# Patient Record
Sex: FEMALE | Race: WHITE | NOT HISPANIC OR LATINO
[De-identification: names, ages, dates, MRNs, and addresses within clinical notes are randomized per-mention and may not be internally consistent; named-entity substitution may affect disease eponyms.]

---

## 2021-07-15 PROBLEM — Z00.00 ENCOUNTER FOR PREVENTIVE HEALTH EXAMINATION: Status: ACTIVE | Noted: 2021-07-15

## 2021-08-20 ENCOUNTER — APPOINTMENT (OUTPATIENT)
Dept: NEUROLOGY | Facility: CLINIC | Age: 26
End: 2021-08-20

## 2021-09-29 ENCOUNTER — APPOINTMENT (OUTPATIENT)
Dept: NEUROLOGY | Facility: CLINIC | Age: 26
End: 2021-09-29
Payer: COMMERCIAL

## 2021-09-29 ENCOUNTER — TRANSCRIPTION ENCOUNTER (OUTPATIENT)
Age: 26
End: 2021-09-29

## 2021-09-29 VITALS
HEART RATE: 145 BPM | SYSTOLIC BLOOD PRESSURE: 175 MMHG | HEIGHT: 69 IN | WEIGHT: 180 LBS | BODY MASS INDEX: 26.66 KG/M2 | DIASTOLIC BLOOD PRESSURE: 102 MMHG

## 2021-09-29 VITALS — SYSTOLIC BLOOD PRESSURE: 148 MMHG | DIASTOLIC BLOOD PRESSURE: 95 MMHG | HEART RATE: 137 BPM

## 2021-09-29 PROCEDURE — 99072 ADDL SUPL MATRL&STAF TM PHE: CPT

## 2021-09-29 PROCEDURE — 99204 OFFICE O/P NEW MOD 45 MIN: CPT

## 2021-09-30 ENCOUNTER — INPATIENT (INPATIENT)
Facility: HOSPITAL | Age: 26
LOS: 4 days | Discharge: HOME | DRG: 99 | End: 2021-10-05
Attending: PSYCHIATRY & NEUROLOGY | Admitting: PSYCHIATRY & NEUROLOGY
Payer: COMMERCIAL

## 2021-09-30 VITALS
RESPIRATION RATE: 19 BRPM | TEMPERATURE: 98 F | WEIGHT: 179.9 LBS | HEIGHT: 69 IN | OXYGEN SATURATION: 100 % | DIASTOLIC BLOOD PRESSURE: 115 MMHG | HEART RATE: 120 BPM | SYSTOLIC BLOOD PRESSURE: 177 MMHG

## 2021-09-30 DIAGNOSIS — R56.9 UNSPECIFIED CONVULSIONS: ICD-10-CM

## 2021-09-30 LAB
ALBUMIN SERPL ELPH-MCNC: 4.2 G/DL — SIGNIFICANT CHANGE UP (ref 3.3–5)
ALP SERPL-CCNC: 37 U/L — LOW (ref 40–120)
ALT FLD-CCNC: 15 U/L — SIGNIFICANT CHANGE UP (ref 10–45)
AMPHET UR-MCNC: NEGATIVE — SIGNIFICANT CHANGE UP
ANION GAP SERPL CALC-SCNC: 17 MMOL/L — SIGNIFICANT CHANGE UP (ref 5–17)
APPEARANCE UR: CLEAR — SIGNIFICANT CHANGE UP
AST SERPL-CCNC: 33 U/L — SIGNIFICANT CHANGE UP (ref 10–40)
BACTERIA # UR AUTO: NEGATIVE — SIGNIFICANT CHANGE UP
BARBITURATES UR SCN-MCNC: NEGATIVE — SIGNIFICANT CHANGE UP
BASOPHILS # BLD AUTO: 0.04 K/UL — SIGNIFICANT CHANGE UP (ref 0–0.2)
BASOPHILS NFR BLD AUTO: 0.3 % — SIGNIFICANT CHANGE UP (ref 0–2)
BENZODIAZ UR-MCNC: NEGATIVE — SIGNIFICANT CHANGE UP
BILIRUB SERPL-MCNC: 0.3 MG/DL — SIGNIFICANT CHANGE UP (ref 0.2–1.2)
BILIRUB UR-MCNC: NEGATIVE — SIGNIFICANT CHANGE UP
BUN SERPL-MCNC: 15 MG/DL — SIGNIFICANT CHANGE UP (ref 7–23)
CALCIUM SERPL-MCNC: 9.2 MG/DL — SIGNIFICANT CHANGE UP (ref 8.4–10.5)
CHLORIDE SERPL-SCNC: 102 MMOL/L — SIGNIFICANT CHANGE UP (ref 96–108)
CO2 SERPL-SCNC: 20 MMOL/L — LOW (ref 22–31)
COCAINE METAB.OTHER UR-MCNC: NEGATIVE — SIGNIFICANT CHANGE UP
COLOR SPEC: SIGNIFICANT CHANGE UP
CREAT SERPL-MCNC: 0.8 MG/DL — SIGNIFICANT CHANGE UP (ref 0.5–1.3)
CRP SERPL-MCNC: <3 MG/L — SIGNIFICANT CHANGE UP (ref 0–4)
DIFF PNL FLD: ABNORMAL
EOSINOPHIL # BLD AUTO: 0.04 K/UL — SIGNIFICANT CHANGE UP (ref 0–0.5)
EOSINOPHIL NFR BLD AUTO: 0.3 % — SIGNIFICANT CHANGE UP (ref 0–6)
EPI CELLS # UR: 3 /HPF — SIGNIFICANT CHANGE UP
ERYTHROCYTE [SEDIMENTATION RATE] IN BLOOD: 31 MM/HR — HIGH (ref 0–15)
GLUCOSE SERPL-MCNC: 90 MG/DL — SIGNIFICANT CHANGE UP (ref 70–99)
GLUCOSE UR QL: NEGATIVE — SIGNIFICANT CHANGE UP
HCG SERPL-ACNC: <2 MIU/ML — SIGNIFICANT CHANGE UP
HCT VFR BLD CALC: 38.9 % — SIGNIFICANT CHANGE UP (ref 34.5–45)
HGB BLD-MCNC: 12.7 G/DL — SIGNIFICANT CHANGE UP (ref 11.5–15.5)
HYALINE CASTS # UR AUTO: 1 /LPF — SIGNIFICANT CHANGE UP (ref 0–2)
IMM GRANULOCYTES NFR BLD AUTO: 1.4 % — SIGNIFICANT CHANGE UP (ref 0–1.5)
KETONES UR-MCNC: NEGATIVE — SIGNIFICANT CHANGE UP
LDH SERPL L TO P-CCNC: 407 U/L — HIGH (ref 50–242)
LEUKOCYTE ESTERASE UR-ACNC: NEGATIVE — SIGNIFICANT CHANGE UP
LYMPHOCYTES # BLD AUTO: 19.8 % — SIGNIFICANT CHANGE UP (ref 13–44)
LYMPHOCYTES # BLD AUTO: 2.45 K/UL — SIGNIFICANT CHANGE UP (ref 1–3.3)
MAGNESIUM SERPL-MCNC: 2 MG/DL — SIGNIFICANT CHANGE UP (ref 1.6–2.6)
MCHC RBC-ENTMCNC: 32 PG — SIGNIFICANT CHANGE UP (ref 27–34)
MCHC RBC-ENTMCNC: 32.6 GM/DL — SIGNIFICANT CHANGE UP (ref 32–36)
MCV RBC AUTO: 98 FL — SIGNIFICANT CHANGE UP (ref 80–100)
METHADONE UR-MCNC: NEGATIVE — SIGNIFICANT CHANGE UP
MONOCYTES # BLD AUTO: 1.32 K/UL — HIGH (ref 0–0.9)
MONOCYTES NFR BLD AUTO: 10.7 % — SIGNIFICANT CHANGE UP (ref 2–14)
NEUTROPHILS # BLD AUTO: 8.34 K/UL — HIGH (ref 1.8–7.4)
NEUTROPHILS NFR BLD AUTO: 67.5 % — SIGNIFICANT CHANGE UP (ref 43–77)
NITRITE UR-MCNC: NEGATIVE — SIGNIFICANT CHANGE UP
NRBC # BLD: 0 /100 WBCS — SIGNIFICANT CHANGE UP (ref 0–0)
OPIATES UR-MCNC: NEGATIVE — SIGNIFICANT CHANGE UP
OXYCODONE UR-MCNC: NEGATIVE — SIGNIFICANT CHANGE UP
PCP SPEC-MCNC: SIGNIFICANT CHANGE UP
PCP UR-MCNC: NEGATIVE — SIGNIFICANT CHANGE UP
PH UR: 7 — SIGNIFICANT CHANGE UP (ref 5–8)
PHOSPHATE SERPL-MCNC: 3.1 MG/DL — SIGNIFICANT CHANGE UP (ref 2.5–4.5)
PLATELET # BLD AUTO: 313 K/UL — SIGNIFICANT CHANGE UP (ref 150–400)
POTASSIUM SERPL-MCNC: 4.1 MMOL/L — SIGNIFICANT CHANGE UP (ref 3.5–5.3)
POTASSIUM SERPL-SCNC: 4.1 MMOL/L — SIGNIFICANT CHANGE UP (ref 3.5–5.3)
PROT SERPL-MCNC: 8.2 G/DL — SIGNIFICANT CHANGE UP (ref 6–8.3)
PROT UR-MCNC: NEGATIVE — SIGNIFICANT CHANGE UP
RBC # BLD: 3.97 M/UL — SIGNIFICANT CHANGE UP (ref 3.8–5.2)
RBC # FLD: 15.3 % — HIGH (ref 10.3–14.5)
RBC CASTS # UR COMP ASSIST: 3 /HPF — SIGNIFICANT CHANGE UP (ref 0–4)
SARS-COV-2 RNA SPEC QL NAA+PROBE: SIGNIFICANT CHANGE UP
SODIUM SERPL-SCNC: 139 MMOL/L — SIGNIFICANT CHANGE UP (ref 135–145)
SP GR SPEC: 1.01 — LOW (ref 1.01–1.02)
THC UR QL: NEGATIVE — SIGNIFICANT CHANGE UP
THYROPEROXIDASE AB SERPL-ACNC: 54.3 IU/ML — HIGH
TSH SERPL-MCNC: 3.31 UIU/ML — SIGNIFICANT CHANGE UP (ref 0.27–4.2)
UROBILINOGEN FLD QL: NEGATIVE — SIGNIFICANT CHANGE UP
WBC # BLD: 12.36 K/UL — HIGH (ref 3.8–10.5)
WBC # FLD AUTO: 12.36 K/UL — HIGH (ref 3.8–10.5)
WBC UR QL: 1 /HPF — SIGNIFICANT CHANGE UP (ref 0–5)

## 2021-09-30 PROCEDURE — 95720 EEG PHY/QHP EA INCR W/VEEG: CPT

## 2021-09-30 PROCEDURE — 93010 ELECTROCARDIOGRAM REPORT: CPT

## 2021-09-30 PROCEDURE — 99285 EMERGENCY DEPT VISIT HI MDM: CPT

## 2021-09-30 PROCEDURE — 99223 1ST HOSP IP/OBS HIGH 75: CPT

## 2021-09-30 RX ORDER — GABAPENTIN 400 MG/1
600 CAPSULE ORAL ONCE
Refills: 0 | Status: DISCONTINUED | OUTPATIENT
Start: 2021-09-30 | End: 2021-09-30

## 2021-09-30 RX ORDER — PANTOPRAZOLE SODIUM 20 MG/1
40 TABLET, DELAYED RELEASE ORAL
Refills: 0 | Status: DISCONTINUED | OUTPATIENT
Start: 2021-09-30 | End: 2021-10-05

## 2021-09-30 RX ORDER — CANNABIDIOL 100 MG/ML
500 SOLUTION ORAL DAILY
Refills: 0 | Status: DISCONTINUED | OUTPATIENT
Start: 2021-09-30 | End: 2021-09-30

## 2021-09-30 RX ORDER — CANNABIDIOL 100 MG/ML
100 SOLUTION ORAL
Refills: 0 | Status: DISCONTINUED | OUTPATIENT
Start: 2021-09-30 | End: 2021-10-05

## 2021-09-30 RX ORDER — LANOLIN ALCOHOL/MO/W.PET/CERES
10 CREAM (GRAM) TOPICAL AT BEDTIME
Refills: 0 | Status: DISCONTINUED | OUTPATIENT
Start: 2021-09-30 | End: 2021-10-01

## 2021-09-30 RX ORDER — HYDROXYCHLOROQUINE SULFATE 200 MG
400 TABLET ORAL DAILY
Refills: 0 | Status: DISCONTINUED | OUTPATIENT
Start: 2021-09-30 | End: 2021-09-30

## 2021-09-30 RX ORDER — HYDROXYCHLOROQUINE SULFATE 200 MG
200 TABLET ORAL
Refills: 0 | Status: DISCONTINUED | OUTPATIENT
Start: 2021-09-30 | End: 2021-10-01

## 2021-09-30 RX ORDER — GABAPENTIN 400 MG/1
600 CAPSULE ORAL DAILY
Refills: 0 | Status: DISCONTINUED | OUTPATIENT
Start: 2021-09-30 | End: 2021-10-05

## 2021-09-30 RX ADMIN — GABAPENTIN 600 MILLIGRAM(S): 400 CAPSULE ORAL at 18:45

## 2021-09-30 RX ADMIN — Medication 10 MILLIGRAM(S): at 21:18

## 2021-09-30 RX ADMIN — Medication 200 MILLIGRAM(S): at 19:33

## 2021-09-30 RX ADMIN — Medication 0.2 MILLIGRAM(S): at 21:18

## 2021-09-30 RX ADMIN — CANNABIDIOL 100 MILLIGRAM(S): 100 SOLUTION ORAL at 21:18

## 2021-09-30 NOTE — ED PROVIDER NOTE - NSICDXPASTMEDICALHX_GEN_ALL_CORE_FT
PAST MEDICAL HISTORY:  Autoimmune encephalitis     Hashimoto's thyroiditis     Systemic lupus erythematosus

## 2021-09-30 NOTE — ED ADULT NURSE NOTE - OBJECTIVE STATEMENT
Pt is a 25 Y A&O x3 F with hx of seizures possibly caused by autoimmune encephalitis on IVIG and steroids, white coat hypertension presenting to ED for EMU admission by neurologist. Pt states her last seizure was in May 2021. Pt denies headache, dizziness, blurry vision, chest pain, SOB. Pt arrives to ED breathing unlabored on RA. PERRL. Pt speaking in complete sentences. Skin is warm and dry. No diaphoresis noted. No edema noted to LE b/l. Sensation intact. Pt has strong strength in all extremities. Pt ambulatory with steady gait. IV established. Safety and comfort maintained.

## 2021-09-30 NOTE — H&P ADULT - HISTORY OF PRESENT ILLNESS
25-y/o R-HF w a h/o Hashimoto thyroiditis (2013), unspecified AIE (2007) and recently diagnosed SLE (12/2020) presenting for further evaluation and management of AIE.  Symptom onset started at age 18.  Initially had insomnia which then progressed to spells vs seizure events.  Describes a sudden 15-30 second generalized convulsion a/w post-event prolonged staring, speech arrest sometimes lasting up to 30 min.  At times she has bowel incontinence.  No tongue bite or urinary incontinence.  She can remain confused up to 1 day afterwards.  Last episode 05/2020.  Current anti-seizure meds include CBD 500mg daily and gabapentin 600 mg daily.      Further manifestations of her AIE included poor functioning in school, reporting poor concentration and poor working memory skills.  PT states she had been dx w/ Hashimoto's encephalopathy in 2013 and TPO antibodies were reportedly elevated.  Per PT, CSF analysis basic studies were normal. CSF AIE panel demonstrated rising TPO, minor elevation in JT no other abnormalities. PET scan done in June 2020, demonstrated cerebellar hypometabolism.     PT's current AIE regiment includes IVIG 70g q2 weeks (last 09/22/21), prednisone 24mg daily.  Was also on rituximab but no longer taking.  She recently had a possible lupus diagnosis, for which she was started on Plaquenil 400mg QD and methotrexate 15 mg.  Had last had attempt of down-titration of prednisone and IVIG but then had re-emergence of spells/seizures.      Of note, when asked regarding her tachycardia, states that she always runs tachycardic and slightly HTN including before her AIE diagnosis. Is followed for this by her PCP who is just monitoring her but no current treatment.

## 2021-09-30 NOTE — H&P ADULT - NSHPLABSRESULTS_GEN_ALL_CORE
12.7   12.36 )-----------( 313      ( 30 Sep 2021 09:24 )             38.9         139  |  102  |  15  ----------------------------<  90  4.1   |  20<L>  |  0.80    Ca    9.2      30 Sep 2021 09:33  Phos  3.1       Mg     2.0         TPro  8.2  /  Alb  4.2  /  TBili  0.3  /  DBili  x   /  AST  33  /  ALT  15  /  AlkPhos  37<L>      Urinalysis Basic - ( 30 Sep 2021 09:28 )    Color: Light Yellow / Appearance: Clear / S.007 / pH: x  Gluc: x / Ketone: Negative  / Bili: Negative / Urobili: Negative   Blood: x / Protein: Negative / Nitrite: Negative   Leuk Esterase: Negative / RBC: 3 /hpf / WBC 1 /HPF   Sq Epi: x / Non Sq Epi: 3 /hpf / Bacteria: Negative

## 2021-09-30 NOTE — ED ADULT NURSE NOTE - NS ED NOTE  TALK SOMEONE YN
pt w/ symptoms c/w possible unstable angina. pt advised cath. pt aware of options/risks including death. No

## 2021-09-30 NOTE — ED PROVIDER NOTE - ATTENDING CONTRIBUTION TO CARE
Attending MD Pierson:  I personally have seen and examined this patient.  Resident note reviewed and agree on plan of care and except where noted.  See HPI, PE, and MDM for details.

## 2021-09-30 NOTE — ED PROVIDER NOTE - OBJECTIVE STATEMENT
25F hx ?autoimmune encephalitis (on biweekly ivig, steroids), ?seizures sent in tba to EMU for 25F hx ?autoimmune encephalitis (on biweekly ivig, steroids), ?seizures sent in tba to EMU for EEG. pt reports last seizure in may, episodes of loss of memory sometimes a/w loss of bowel continence. Pt reports otherwise nl, no episode since may. Denies symptoms at this time.

## 2021-09-30 NOTE — H&P ADULT - ASSESSMENT
INCOMPLETE  Assessment:  25y R-handed F with h/o hashimoto's thyroiditis, autoimmune encephalitis and recently diagnosed SLE.   On exam, she has no neurologic deficit.     IMPRESSION: Known autoimmune encephalitis of undetermined specific serology, ? Limbic encephalitis, possibly related to Hashimoto vs. JT from prior work up.      Plan  [] Admit to EMU for further workup of specific AIE  [] LP with repeat AIE panel including markers for synaptic dysfunction  [] 24 hr vEEG to evaluate for slowing  [] 5 days of 1000mg methylprednisolone.    [] Send TPO thyroglobulin.    [] D/C methotrexate and PO prednisone 24 mg QD.    [] Continue sleep meds   [] No anti-epileptics for now as PT   [] Monitor BP and HR, PT apparently runs baseline tachycardic.      Braxton Camarena, DO  PGY-4, Chief Neurology Resident    Please note attending attestation to follow.   INCOMPLETE  Assessment:  25y R-handed F with h/o hashimoto's thyroiditis, autoimmune encephalitis and recently diagnosed SLE.   On exam, she has no neurologic deficit.     IMPRESSION: Known autoimmune encephalitis of undetermined specific serology, ? Limbic encephalitis, possibly related to Hashimoto vs. JT from prior work up.      Plan  [] Admit to EMU for further workup of specific AIE  [] LP with repeat AIE panel including markers for synaptic dysfunction  [] 24 hr vEEG to evaluate for slowing  [] 5 days of 1000mg methylprednisolone.    [] Send TPO thyroglobulin.    [] D/C methotrexate and PO prednisone 24 mg QD.    [] Continue sleep meds   [] No anti-epileptics for now as PT   [] Plan to repeat MRI brain w/wo w epilepsy protocol and at some point PET scan  [] PT may need Actemra   [] Last IVIG 09/22.   [] Monitor BP and HR, PT apparently runs baseline tachycardic.      Braxton Camarena, DO  PGY-4, Chief Neurology Resident    Please note attending attestation to follow.   Assessment:  25y R-handed F with h/o hashimoto's thyroiditis, autoimmune encephalitis and recently diagnosed SLE. On exam, she has no neurologic deficit.     IMPRESSION: Previously diagnosed autoimmune encephalitis of undetermined specific serology, perhaps secondary to Hashimoto vs. JT.  DDx perhaps including limbic encephalitis as well.     Plan  [] Admit to EMU for further workup of specific AIE  [] LP with repeat AIE panel including markers for synaptic dysfunction  [] 24 hr vEEG to evaluate for slowing  [] 5 days of 1000mg methylprednisolone.    [] Send TPO thyroglobulin.    [] D/C methotrexate and PO prednisone 24 mg QD.    [] Continue sleep meds   [] No anti-epileptics for now as PT   [] Plan to repeat MRI brain w/wo w epilepsy protocol and at some point PET scan  [] PT may need Actemra   [] Last IVIG 09/22.   [] Monitor BP and HR, PT apparently runs baseline tachycardic.      Braxton Camarena, DO  PGY-4, Chief Neurology Resident    Please note attending attestation to follow.

## 2021-09-30 NOTE — ED PROVIDER NOTE - CLINICAL SUMMARY MEDICAL DECISION MAKING FREE TEXT BOX
Attending MD Pierson:  25F with reported history of autoimmune encephalitis, ?seizures presenting at request of Dr. Tineo for admission to EMU for EEG for possible seizures. Patient states seizure like episodes last occurred in may this year, nothing since. Patient here tachycardic and mildly hypertensive to 160s systolic HR 110s, she attributes this to "white coat hypertension". Grossly non-focal neuro exam. We will contact neurology service and defer to their expertise on further work up and management of patient's suspected seizure disorder.      *The above represents an initial assessment/impression. Please refer to progress notes for potential changes in patient clinical course*

## 2021-09-30 NOTE — H&P ADULT - NSHPPHYSICALEXAM_GEN_ALL_CORE
General: Obese, sitting up in bed in NAD  Cardiac: Tachycardic in sinus rhythm, no murmurs rubs or gallops  Resp: CTA throughout  Mental Status: AxO to person, place, situation, time. 3/3 memory on immediate recall and on 5 minute delay.   Language: Fluent with preserved naming, repetition and comprehension.  Follows all commands.  Cranial Nerves: PERRL (R = 3mm, L = 3mm).  EOMI no nystagmus. V1-3 intact to light touch.  No facial asymmetry b/l.  Hearing grossly normal to conversation.  Speech clear.  Symmetric palate elevation in midline.  Tongue midline, normal movements.  Cortical: Visual fields intact.  No extinction on double simultaneous touch and no signs of neglect.   Motor: Normal muscle bulk & tone.  5/5 strength throughout.    Sensation: Symmetric B/L preserved sensation to light touch, temperature, proprioception..    Reflexes: 2/4 throughout.  Plantar Responses are downgoing B/L.   Coordination: Intact rapid-alternating movements. No dysmetria on finger-to-nose or heel-to-shin.  No dysdiadochokinesia  Gait: Normal stance, stride length, touch off, arm swing and gomez.   Normal Romberg. No postural instability. Vital Signs Last 24 Hrs  T(C): 36.9 (30 Sep 2021 09:03), Max: 36.9 (30 Sep 2021 09:03)  T(F): 98.4 (30 Sep 2021 09:03), Max: 98.4 (30 Sep 2021 09:03)  HR: 110 (30 Sep 2021 09:03) (110 - 120)  BP: 164/114 (30 Sep 2021 09:03) (164/114 - 177/115)  BP(mean): 128 (30 Sep 2021 09:03) (128 - 128)  RR: 20 (30 Sep 2021 09:03) (19 - 20)  SpO2: 100% (30 Sep 2021 09:03) (100% - 100%)    General: Obese, sitting up in bed in NAD  Cardiac: Tachycardic in sinus rhythm, no murmurs rubs or gallops  Resp: CTA throughout  Mental Status: AxO to person, place, situation, time. 3/3 memory on immediate recall and on 5 minute delay.   Language: Fluent with preserved naming, repetition and comprehension.  Follows all commands.  Cranial Nerves: PERRL (R = 3mm, L = 3mm).  EOMI no nystagmus. V1-3 intact to light touch.  No facial asymmetry b/l.  Hearing grossly normal to conversation.  Speech clear.  Symmetric palate elevation in midline.  Tongue midline, normal movements.  Cortical: Visual fields intact.  No extinction on double simultaneous touch and no signs of neglect.   Motor: Normal muscle bulk & tone.  5/5 strength throughout.    Sensation: Symmetric B/L preserved sensation to light touch, temperature, proprioception..    Reflexes: 2/4 throughout.  Plantar Responses are downgoing B/L.   Coordination: Intact rapid-alternating movements. No dysmetria on finger-to-nose or heel-to-shin.  No dysdiadochokinesia  Gait: Normal stance, stride length, touch off, arm swing and gomez.   Normal Romberg. No postural instability.

## 2021-09-30 NOTE — ED PROVIDER NOTE - PHYSICAL EXAMINATION
Gen: WDWN, NAD  HEENT: EOMI, no nasal discharge, mucous membranes moist  CV: RRR, +S1/S2, no M/R/G  Resp: CTAB, no W/R/R  GI: Abdomen soft non-distended, NTTP, no masses  MSK: No open wounds, no bruising, no LE edema  Neuro: CN2-12 grossly intact, A&Ox4, MS +5/5 in UE and LE BL, finger to nose smooth and rapid, gross sensation intact in UE and LE BL, negative pronator drift   Psych: appropriate mood, denies AH, VH, SI

## 2021-10-01 LAB
APPEARANCE CSF: CLEAR — SIGNIFICANT CHANGE UP
COLOR CSF: SIGNIFICANT CHANGE UP
COVID-19 SPIKE DOMAIN AB INTERP: POSITIVE
COVID-19 SPIKE DOMAIN ANTIBODY RESULT: 19.6 U/ML — HIGH
GLUCOSE CSF-MCNC: 60 MG/DL — SIGNIFICANT CHANGE UP (ref 40–70)
HAV IGM SER-ACNC: SIGNIFICANT CHANGE UP
HBV CORE IGM SER-ACNC: SIGNIFICANT CHANGE UP
HBV SURFACE AG SER-ACNC: SIGNIFICANT CHANGE UP
HCV AB S/CO SERPL IA: 0.28 S/CO — SIGNIFICANT CHANGE UP (ref 0–0.99)
HCV AB SERPL-IMP: SIGNIFICANT CHANGE UP
LDH CSF L TO P-CCNC: 43 U/L — SIGNIFICANT CHANGE UP
LDH FLD-CCNC: 43 U/L — SIGNIFICANT CHANGE UP
LYMPHOCYTES # CSF: 87 % — HIGH (ref 40–80)
MONOS+MACROS NFR CSF: 13 % — LOW (ref 15–45)
NEUTROPHILS # CSF: 0 % — SIGNIFICANT CHANGE UP (ref 0–6)
NRBC NFR CSF: 2 /UL — SIGNIFICANT CHANGE UP (ref 0–5)
PROT CSF-MCNC: 26 MG/DL — SIGNIFICANT CHANGE UP (ref 15–45)
RBC # CSF: 0 /UL — SIGNIFICANT CHANGE UP (ref 0–0)
SARS-COV-2 IGG+IGM SERPL QL IA: 19.6 U/ML — HIGH
SARS-COV-2 IGG+IGM SERPL QL IA: POSITIVE
T PALLIDUM AB TITR SER: POSITIVE
T3 SERPL-MCNC: 166 NG/DL — SIGNIFICANT CHANGE UP (ref 80–200)
T4 AB SER-ACNC: 8.1 UG/DL — SIGNIFICANT CHANGE UP (ref 4.6–12)
THYROGLOB AB FLD IA-ACNC: 27.5 IU/ML — SIGNIFICANT CHANGE UP
THYROGLOB AB SERPL-ACNC: 27.5 IU/ML — SIGNIFICANT CHANGE UP
TSH SERPL-MCNC: 1.14 UIU/ML — SIGNIFICANT CHANGE UP (ref 0.27–4.2)
TUBE TYPE: SIGNIFICANT CHANGE UP

## 2021-10-01 PROCEDURE — 88188 FLOWCYTOMETRY/READ 9-15: CPT

## 2021-10-01 PROCEDURE — 99233 SBSQ HOSP IP/OBS HIGH 50: CPT

## 2021-10-01 PROCEDURE — 95720 EEG PHY/QHP EA INCR W/VEEG: CPT

## 2021-10-01 RX ORDER — LIDOCAINE HCL 20 MG/ML
10 VIAL (ML) INJECTION ONCE
Refills: 0 | Status: DISCONTINUED | OUTPATIENT
Start: 2021-10-01 | End: 2021-10-05

## 2021-10-01 RX ORDER — CANNABIDIOL 100 MG/ML
0.5 SOLUTION ORAL
Qty: 0 | Refills: 0 | DISCHARGE

## 2021-10-01 RX ORDER — LANOLIN ALCOHOL/MO/W.PET/CERES
10 CREAM (GRAM) TOPICAL AT BEDTIME
Refills: 0 | Status: DISCONTINUED | OUTPATIENT
Start: 2021-10-01 | End: 2021-10-05

## 2021-10-01 RX ORDER — FOLIC ACID 0.8 MG
1 TABLET ORAL DAILY
Refills: 0 | Status: DISCONTINUED | OUTPATIENT
Start: 2021-10-01 | End: 2021-10-05

## 2021-10-01 RX ORDER — HYDROXYCHLOROQUINE SULFATE 200 MG
400 TABLET ORAL
Qty: 0 | Refills: 0 | DISCHARGE

## 2021-10-01 RX ORDER — NORGESTIMATE AND ETHINYL ESTRADIOL 7DAYSX3 LO
1 KIT ORAL
Qty: 0 | Refills: 0 | DISCHARGE

## 2021-10-01 RX ORDER — ALENDRONATE SODIUM 70 MG/1
1 TABLET ORAL
Qty: 0 | Refills: 0 | DISCHARGE

## 2021-10-01 RX ORDER — ENOXAPARIN SODIUM 100 MG/ML
40 INJECTION SUBCUTANEOUS AT BEDTIME
Refills: 0 | Status: DISCONTINUED | OUTPATIENT
Start: 2021-10-01 | End: 2021-10-05

## 2021-10-01 RX ADMIN — Medication 58 MILLIGRAM(S): at 11:46

## 2021-10-01 RX ADMIN — PANTOPRAZOLE SODIUM 40 MILLIGRAM(S): 20 TABLET, DELAYED RELEASE ORAL at 06:18

## 2021-10-01 RX ADMIN — ENOXAPARIN SODIUM 40 MILLIGRAM(S): 100 INJECTION SUBCUTANEOUS at 22:12

## 2021-10-01 RX ADMIN — Medication 10 MILLIGRAM(S): at 22:08

## 2021-10-01 RX ADMIN — CANNABIDIOL 100 MILLIGRAM(S): 100 SOLUTION ORAL at 08:09

## 2021-10-01 RX ADMIN — GABAPENTIN 600 MILLIGRAM(S): 400 CAPSULE ORAL at 22:08

## 2021-10-01 RX ADMIN — Medication 1 MILLIGRAM(S): at 11:42

## 2021-10-01 RX ADMIN — Medication 200 MILLIGRAM(S): at 06:18

## 2021-10-01 RX ADMIN — CANNABIDIOL 100 MILLIGRAM(S): 100 SOLUTION ORAL at 16:51

## 2021-10-01 RX ADMIN — Medication 0.2 MILLIGRAM(S): at 22:09

## 2021-10-01 NOTE — EEG REPORT - NS EEG TEXT BOX
EPILEPSY MONITORING UNIT REPORT   [facility]: [address], [place] [address], Ph#: [phone] [facility]: [address] [place] [address], [place] [address], Ph#: [phone] Office: [address] [place] [address] Ph#: [phone]  Patient Name: Marely Escamilla   Age: 25 years, : 1995 MRN #: [id number], Lewis: U 464 D  Referring Physician: Dr. Tineo   /   DEVIN admit          Study Started: 17:00 on 2021 Study Ended: hh:mm on /xx/xxxx              Study Information:  EEG Recording Technique: The patient underwent continuous Video-EEG monitoring, using Telemetry System hardware on the XLTek Digital System. EEG and video data were stored on a computer hard drive with important events saved in digital archive files. The material was reviewed by a physician (electroencephalographer / epileptologist) on a daily basis. Vamshi and seizure detection algorithms were utilized and reviewed. An EEG Technician attended to the patient, and was available throughout daytime work hours.  The epilepsy center neurologist was available in person or on call 24-hours per day.  EEG Placement and Labeling of Electrodes: The EEG was performed utilizing 20 channel referential EEG connections (coronal over temporal over parasagittal montage) using all standard 10-20 electrode placements with EKG, with additional electrodes placed in the inferior temporal region using the modified 10-10 montage electrode placements for elective admissions, or if deemed necessary. Recording was at a sampling rate of 256 samples per second per channel. Time synchronized digital video recording was done simultaneously with EEG recording. A low light infrared camera was used for low light recording.   History:  25-y/o R-HF w a h/o Hashimoto thyroiditis (), unspecified AIE () and recently diagnosed SLE (2020) presenting for further evaluation and management of AIE. Symptom onset started at age 18.  Initially had insomnia which then progressed to spells vs seizure events.  Describes a sudden 15-30 second generalized convulsion a/w post-event prolonged staring, speech arrest sometimes lasting up to 30 min.  At times she has bowel incontinence. No tongue bite or urinary incontinence. She can remain confused up to 1 day afterwards.  Last episode 2020.  Current anti-seizure meds include CBD 500mg daily and gabapentin 600 mg daily.   Further manifestations of her AIE included poor functioning in school, reporting poor concentration and poor working memory skills.  PT states she had been dx w/ Hashimoto's encephalopathy in  and TPO antibodies were reportedly elevated.  Per PT, CSF analysis basic studies were normal. CSF AIE panel demonstrated rising TPO, minor elevation in JT no other abnormalities. PET scan done in 2020, demonstrated cerebellar hypometabolism.  PT's current AIE regiment includes IVIG 70g q2 weeks (last 21), prednisone 24mg daily.  Was also on rituximab but no longer taking.  She recently had a possible lupus diagnosis, for which she was started on Plaquenil 400mg QD and methotrexate 15 mg.  Had last had attempt of down-titration of prednisone and IVIG but then had re-emergence of spells/seizures.  Of note, when asked regarding her tachycardia, states that she always runs tachycardic and slightly HTN including before her AIE diagnosis. Is followed for this by her PCP who is just monitoring her but no current treatment.     Home Antiepileptic Medication and Device  Cannabidiol 100 BID, Gabapentin 600 QD  Interpretation:  Day 1 – 	Start: 2021  17:00 	End: 10/01/2021  08:00 	Duration: 14 hr  40 min  Daily EEG Visual Analysis  FINDINGS:  The background was continuous, spontaneously variable and reactive. During wakefulness, the posterior dominant rhythm consisted of symmetric, well-modulated 10 Hz activity, with amplitude to 30 uV, that attenuated to eye opening.  Low amplitude frontal beta was noted in wakefulness.  Background Slowing: Diffuse theta and delta slowing.  Focal Slowing:  None were present.  Sleep Background: Drowsiness was characterized by fragmentation, attenuation, and slowing of the background activity.   Sleep was characterized by the presence of vertex waves, symmetric sleep spindles and K-complexes.  Other Non-Epileptiform Findings: None were present.  Activation Procedures:  Hyperventilation was not performed.   Photic stimulation was not performed.  Interictal Epileptiform Activity:  None were present.  Events: No events or seizures recorded.  Artifacts: Intermittent myogenic and movement artifacts were noted.  ECG: The heart rate on single channel ECG was predominantly between  BPM.  AEDs:   Cannabidiol 100 BID, Gabapentin 600 QD  EEG Summary:  Abnormal EEG in the awake, drowsy and asleep states. Background slowing, generalized  Impression/Clinical Correlate:  This is an abnormal EEG record due to presence of mild nonspecific diffuse or multifocal cerebral dysfunction.  No epileptiform pattern or seizures were seen.  *** PRELIMINARY REPORT - PENDING EPILEPSY ATTENDING REVIEW ***   Charles Guevara MD, ELISABET Fellow, [facility]    ------------------------------------ EEG Reading Room: [phone] On Call Service After Hours: [phone]       EPILEPSY MONITORING UNIT REPORT   [facility]: [address], [place] [address], Ph#: [phone] [facility]: [address], [place] [address], Ph#: [phone] Office: [address] [place] [address] Ph#: [phone]  Patient Name: Marely Escamilla   Age: 25 years, : 1995 MRN #: [id number], Lewis: U 464 D  Referring Physician: Dr. Tineo   /   DEVIN admit             Study Information:  EEG Recording Technique: The patient underwent continuous Video-EEG monitoring, using Telemetry System hardware on the XLTek Digital System. EEG and video data were stored on a computer hard drive with important events saved in digital archive files. The material was reviewed by a physician (electroencephalographer / epileptologist) on a daily basis. Vamshi and seizure detection algorithms were utilized and reviewed. An EEG Technician attended to the patient, and was available throughout daytime work hours.  The epilepsy center neurologist was available in person or on call 24-hours per day.  EEG Placement and Labeling of Electrodes: The EEG was performed utilizing 20 channel referential EEG connections (coronal over temporal over parasagittal montage) using all standard 10-20 electrode placements with EKG, with additional electrodes placed in the inferior temporal region using the modified 10-10 montage electrode placements for elective admissions, or if deemed necessary. Recording was at a sampling rate of 256 samples per second per channel. Time synchronized digital video recording was done simultaneously with EEG recording. A low light infrared camera was used for low light recording.   History:  25-y/o R-HF w a h/o Hashimoto thyroiditis (), unspecified AIE () and recently diagnosed SLE (2020) presenting for further evaluation and management of AIE. Symptom onset started at age 18.  Initially had insomnia which then progressed to spells vs seizure events.  Describes a sudden 15-30 second generalized convulsion a/w post-event prolonged staring, speech arrest sometimes lasting up to 30 min.  At times she has bowel incontinence. No tongue bite or urinary incontinence. She can remain confused up to 1 day afterwards.  Last episode 2020.  Current anti-seizure meds include CBD 500mg daily and gabapentin 600 mg daily.   Further manifestations of her AIE included poor functioning in school, reporting poor concentration and poor working memory skills.  PT states she had been dx w/ Hashimoto's encephalopathy in  and TPO antibodies were reportedly elevated.  Per PT, CSF analysis basic studies were normal. CSF AIE panel demonstrated rising TPO, minor elevation in JT no other abnormalities. PET scan done in 2020, demonstrated cerebellar hypometabolism.  PT's current AIE regiment includes IVIG 70g q2 weeks (last 21), prednisone 24mg daily.  Was also on rituximab but no longer taking.  She recently had a possible lupus diagnosis, for which she was started on Plaquenil 400mg QD and methotrexate 15 mg.  Had last had attempt of down-titration of prednisone and IVIG but then had re-emergence of spells/seizures.  Of note, when asked regarding her tachycardia, states that she always runs tachycardic and slightly HTN including before her AIE diagnosis. Is followed for this by her PCP who is just monitoring her but no current treatment.     Home Antiepileptic Medication and Device  Cannabidiol 100 BID, Gabapentin 600 QD  Interpretation:  Day 1 – 	Start: 2021  17:00 	End: 10/01/2021  08:00 	Duration: 14 hr  40 min  Daily EEG Visual Analysis  FINDINGS:  The background was continuous, spontaneously variable and reactive. During wakefulness, the posterior dominant rhythm consisted of symmetric, well-modulated 10 Hz activity, with amplitude to 30 uV, that attenuated to eye opening.  Low amplitude frontal beta was noted in wakefulness.  Background Slowing: Diffuse theta and delta slowing.  Focal Slowing:  None were present.  Sleep Background: Drowsiness was characterized by fragmentation, attenuation, and slowing of the background activity.   Sleep was characterized by the presence of vertex waves, symmetric sleep spindles and K-complexes.  Other Non-Epileptiform Findings: None were present.  Activation Procedures:  Hyperventilation was not performed.   Photic stimulation was not performed.  Interictal Epileptiform Activity:  None were present.  Events: No events or seizures recorded.  Artifacts: Intermittent myogenic and movement artifacts were noted.  ECG: The heart rate on single channel ECG was predominantly between  BPM.  AEDs:   Cannabidiol 100 BID, Gabapentin 600 QD  EEG Summary:  Abnormal EEG in the awake, drowsy and asleep states. Background slowing, generalized  Impression/Clinical Correlate:  This is an abnormal EEG record due to presence of mild nonspecific diffuse or multifocal cerebral dysfunction.  No epileptiform pattern or seizures were seen.  *** PRELIMINARY REPORT - PENDING EPILEPSY ATTENDING REVIEW ***   Charles Guevara MD, ELISABET Fellow, [facility] [place]    ------------------------------------ EEG Reading Room: [phone] On Call Service After Hours: [phone]       EPILEPSY MONITORING UNIT REPORT   [facility]: [address], [place] [address], Ph#: [phone] [facility]: [address], [place] [address], Ph#: [phone] Office: [address] [place] [address] Ph#: [phone]  Patient Name: Marely Escamilla   Age: 25 years, : 1995 MRN #: [id number], Lewis: U 464 D  Referring Physician: Dr. Tineo   /   DEVIN admit             Study Information:  EEG Recording Technique: The patient underwent continuous Video-EEG monitoring, using Telemetry System hardware on the XLTek Digital System. EEG and video data were stored on a computer hard drive with important events saved in digital archive files. The material was reviewed by a physician (electroencephalographer / epileptologist) on a daily basis. Vamshi and seizure detection algorithms were utilized and reviewed. An EEG Technician attended to the patient, and was available throughout daytime work hours.  The epilepsy center neurologist was available in person or on call 24-hours per day.  EEG Placement and Labeling of Electrodes: The EEG was performed utilizing 20 channel referential EEG connections (coronal over temporal over parasagittal montage) using all standard 10-20 electrode placements with EKG, with additional electrodes placed in the inferior temporal region using the modified 10-10 montage electrode placements for elective admissions, or if deemed necessary. Recording was at a sampling rate of 256 samples per second per channel. Time synchronized digital video recording was done simultaneously with EEG recording. A low light infrared camera was used for low light recording.   History:  25-y/o R-HF w a h/o Hashimoto thyroiditis (), unspecified AIE () and recently diagnosed SLE (2020) presenting for further evaluation and management of AIE. Symptom onset started at age 18.  Initially had insomnia which then progressed to spells vs seizure events.  Describes a sudden 15-30 second generalized convulsion a/w post-event prolonged staring, speech arrest sometimes lasting up to 30 min.  At times she has bowel incontinence. No tongue bite or urinary incontinence. She can remain confused up to 1 day afterwards.  Last episode 2020.  Current anti-seizure meds include CBD 500mg daily and gabapentin 600 mg daily.   Further manifestations of her AIE included poor functioning in school, reporting poor concentration and poor working memory skills.  PT states she had been dx w/ Hashimoto's encephalopathy in  and TPO antibodies were reportedly elevated.  Per PT, CSF analysis basic studies were normal. CSF AIE panel demonstrated rising TPO, minor elevation in JT no other abnormalities. PET scan done in 2020, demonstrated cerebellar hypometabolism.  PT's current AIE regiment includes IVIG 70g q2 weeks (last 21), prednisone 24mg daily.  Was also on rituximab but no longer taking.  She recently had a possible lupus diagnosis, for which she was started on Plaquenil 400mg QD and methotrexate 15 mg.  Had last had attempt of down-titration of prednisone and IVIG but then had re-emergence of spells/seizures.  Of note, when asked regarding her tachycardia, states that she always runs tachycardic and slightly HTN including before her AIE diagnosis. Is followed for this by her PCP who is just monitoring her but no current treatment.     Home Antiepileptic Medication and Device  Cannabidiol 100 BID, Gabapentin 600 QD  Interpretation:  Day 1 – 	Start: 2021  17:00 	End: 10/01/2021  08:00 	Duration: 14 hr  40 min  Daily EEG Visual Analysis  FINDINGS:  The background was continuous, spontaneously variable and reactive. During wakefulness, the posterior dominant rhythm consisted of symmetric, well-modulated 10 Hz activity, with amplitude to 30 uV, that attenuated to eye opening.  Low amplitude frontal beta was noted in wakefulness.  Background Slowing: Diffuse theta and delta slowing.  Focal Slowing:  None were present.  Sleep Background: Drowsiness was characterized by fragmentation, attenuation, and slowing of the background activity.   Sleep was characterized by the presence of vertex waves, symmetric sleep spindles and K-complexes.  Other Non-Epileptiform Findings: None were present.  Activation Procedures:  Hyperventilation was not performed.   Photic stimulation was not performed.  Interictal Epileptiform Activity:  None were present.  Events: No events or seizures recorded.  Artifacts: Intermittent myogenic and movement artifacts were noted.  ECG: The heart rate on single channel ECG was predominantly between  BPM.  AEDs:   Cannabidiol 100 BID, Gabapentin 600 QD  EEG Summary:  Abnormal EEG in the awake, drowsy and asleep states. Background slowing, generalized  Impression/Clinical Correlate:  This is an abnormal EEG record due to presence of mild nonspecific diffuse or multifocal cerebral dysfunction.  No epileptiform pattern or seizures were seen.   Charles Guevara MD, ELISABET Fellow, [facility] [place]    ------------------------------------ EEG Reading Room: [phone] On Call Service After Hours: [phone]

## 2021-10-01 NOTE — PROGRESS NOTE ADULT - SUBJECTIVE AND OBJECTIVE BOX
Interval History:  Patient seen and examined at the bedside. No acute events overnight.     MEDICATIONS  (STANDING):  cannabidiol Oral Solution 100 milliGRAM(s) Oral two times a day with meals  cloNIDine 0.2 milliGRAM(s) Oral at bedtime  gabapentin 600 milliGRAM(s) Oral daily  hydroxychloroquine 200 milliGRAM(s) Oral two times a day  lidocaine 1% (Preservative-free) Injectable 10 milliLiter(s) Local Injection once  melatonin 10 milliGRAM(s) Oral at bedtime  pantoprazole    Tablet 40 milliGRAM(s) Oral before breakfast    MEDICATIONS  (PRN):  LORazepam   Injectable 2 milliGRAM(s) IV Push once PRN For convulsion lasting >3 min    Allergies  Benadryl (Anaphylaxis)    Intolerances      ROS: Pertinent positives in HPI, all other ROS were reviewed and are negative.      Vital Signs Last 24 Hrs  T(C): 36.7 (01 Oct 2021 04:47), Max: 36.9 (30 Sep 2021 09:03)  T(F): 98.1 (01 Oct 2021 04:47), Max: 98.4 (30 Sep 2021 09:03)  HR: 86 (01 Oct 2021 04:47) (86 - 120)  BP: 112/75 (01 Oct 2021 04:47) (112/75 - 177/115)  BP(mean): 128 (30 Sep 2021 09:03) (128 - 128)  RR: 18 (01 Oct 2021 04:47) (16 - 20)  SpO2: 98% (01 Oct 2021 04:47) (96% - 100%)    NEUROLOGICAL EXAM:  MS: AAOX3, fluent, attends b/l; recent and remote memory intact; normal attention, language and fund of knowledge.   CN: VFF, EOMI, PERRL, no SCOTT, no APD,  V1-3 intact, no facial asymmetry, t/p midline, SCM/trap intact.  Eyes-Fundi: no papilledema.  Motor: Strength: 5/5 4x. Tone: normal. Bulk: normal. DTR 2+ symm.  Plantar flex b/l. Sensation: intact to LT/PP/Vibration/Position/Temperature 4x.   Coordination: intact 4x.   Gait:  Romberg negative, pull test negative; walks with narrow base, pivots in 2 steps.      Labs:   cbc                      12.7   12.36 )-----------( 313      ( 30 Sep 2021 09:24 )             38.9     [id number]    139  |  102  |  15  ----------------------------<  90  4.1   |  20<L>  |  0.80    Ca    9.2      30 Sep 2021 09:33  Phos  3.1       Mg     2.0         TPro  8.2  /  Alb  4.2  /  TBili  0.3  /  DBili  x   /  AST  33  /  ALT  15  /  AlkPhos  37<L>      Coags  Lipids  A1C  CardiacMarkers    LFTsLIVER FUNCTIONS - ( 30 Sep 2021 09:33 )  Alb: 4.2 g/dL / Pro: 8.2 g/dL / ALK PHOS: 37 U/L / ALT: 15 U/L / AST: 33 U/L / GGT: x           UAUrinalysis Basic - ( 30 Sep 2021 09:28 )    Color: Light Yellow / Appearance: Clear / S.007 / pH: x  Gluc: x / Ketone: Negative  / Bili: Negative / Urobili: Negative   Blood: x / Protein: Negative / Nitrite: Negative   Leuk Esterase: Negative / RBC: 3 /hpf / WBC 1 /HPF   Sq Epi: x / Non Sq Epi: 3 /hpf / Bacteria: Negative     Interval History:  Patient seen and examined at the bedside. No acute events overnight.     MEDICATIONS  (STANDING):  cannabidiol Oral Solution 100 milliGRAM(s) Oral two times a day with meals  cloNIDine 0.2 milliGRAM(s) Oral at bedtime  gabapentin 600 milliGRAM(s) Oral daily  hydroxychloroquine 200 milliGRAM(s) Oral two times a day  lidocaine 1% (Preservative-free) Injectable 10 milliLiter(s) Local Injection once  melatonin 10 milliGRAM(s) Oral at bedtime  pantoprazole    Tablet 40 milliGRAM(s) Oral before breakfast    MEDICATIONS  (PRN):  LORazepam   Injectable 2 milliGRAM(s) IV Push once PRN For convulsion lasting >3 min    Allergies  Benadryl (Anaphylaxis)    Intolerances      ROS: Pertinent positives in HPI, all other ROS were reviewed and are negative.      Vital Signs Last 24 Hrs  T(C): 36.7 (01 Oct 2021 04:47), Max: 36.9 (30 Sep 2021 09:03)  T(F): 98.1 (01 Oct 2021 04:47), Max: 98.4 (30 Sep 2021 09:03)  HR: 86 (01 Oct 2021 04:47) (86 - 120)  BP: 112/75 (01 Oct 2021 04:47) (112/75 - 177/115)  BP(mean): 128 (30 Sep 2021 09:03) (128 - 128)  RR: 18 (01 Oct 2021 04:47) (16 - 20)  SpO2: 98% (01 Oct 2021 04:47) (96% - 100%)    NEUROLOGICAL EXAM:  MS: AAOX3, fluent, attends b/l; recent and remote memory intact; normal attention, language and fund of knowledge.   CN: VFF, EOMI, PERRL, no SCOTT, no APD,  V1-3 intact, no facial asymmetry, t/p midline, SCM/trap intact.  Eyes-Fundi: no papilledema.  Motor: Strength: 5/5 4x.   Sensation: intact to LT 4x.   Coordination: intact 4x.   Gait:  Deferred while on EEG      Labs:   cbc                      12.7   12.36 )-----------( 313      ( 30 Sep 2021 09:24 )             38.9     [id number]    139  |  102  |  15  ----------------------------<  90  4.1   |  20<L>  |  0.80    Ca    9.2      30 Sep 2021 09:33  Phos  3.1       Mg     2.0         TPro  8.2  /  Alb  4.2  /  TBili  0.3  /  DBili  x   /  AST  33  /  ALT  15  /  AlkPhos  37<L>      Coags  Lipids  A1C  CardiacMarkers    LFTsLIVER FUNCTIONS - ( 30 Sep 2021 09:33 )  Alb: 4.2 g/dL / Pro: 8.2 g/dL / ALK PHOS: 37 U/L / ALT: 15 U/L / AST: 33 U/L / GGT: x           UAUrinalysis Basic - ( 30 Sep 2021 09:28 )    Color: Light Yellow / Appearance: Clear / S.007 / pH: x  Gluc: x / Ketone: Negative  / Bili: Negative / Urobili: Negative   Blood: x / Protein: Negative / Nitrite: Negative   Leuk Esterase: Negative / RBC: 3 /hpf / WBC 1 /HPF   Sq Epi: x / Non Sq Epi: 3 /hpf / Bacteria: Negative

## 2021-10-02 LAB
ALBUMIN SERPL ELPH-MCNC: 4.2 G/DL — SIGNIFICANT CHANGE UP (ref 3.3–5)
ALP SERPL-CCNC: 40 U/L — SIGNIFICANT CHANGE UP (ref 40–120)
ALT FLD-CCNC: 16 U/L — SIGNIFICANT CHANGE UP (ref 10–45)
ANION GAP SERPL CALC-SCNC: 17 MMOL/L — SIGNIFICANT CHANGE UP (ref 5–17)
AST SERPL-CCNC: 25 U/L — SIGNIFICANT CHANGE UP (ref 10–40)
BILIRUB SERPL-MCNC: 0.3 MG/DL — SIGNIFICANT CHANGE UP (ref 0.2–1.2)
BUN SERPL-MCNC: 14 MG/DL — SIGNIFICANT CHANGE UP (ref 7–23)
CALCIUM SERPL-MCNC: 9.3 MG/DL — SIGNIFICANT CHANGE UP (ref 8.4–10.5)
CHLORIDE SERPL-SCNC: 102 MMOL/L — SIGNIFICANT CHANGE UP (ref 96–108)
CO2 SERPL-SCNC: 15 MMOL/L — LOW (ref 22–31)
CREAT SERPL-MCNC: 0.57 MG/DL — SIGNIFICANT CHANGE UP (ref 0.5–1.3)
GAMMA INTERFERON BACKGROUND BLD IA-ACNC: 0.03 IU/ML — SIGNIFICANT CHANGE UP
GLUCOSE SERPL-MCNC: 137 MG/DL — HIGH (ref 70–99)
HCT VFR BLD CALC: 40.4 % — SIGNIFICANT CHANGE UP (ref 34.5–45)
HGB BLD-MCNC: 13.1 G/DL — SIGNIFICANT CHANGE UP (ref 11.5–15.5)
M TB IFN-G BLD-IMP: NEGATIVE — SIGNIFICANT CHANGE UP
M TB IFN-G CD4+ BCKGRND COR BLD-ACNC: -0.01 IU/ML — SIGNIFICANT CHANGE UP
M TB IFN-G CD4+CD8+ BCKGRND COR BLD-ACNC: -0.01 IU/ML — SIGNIFICANT CHANGE UP
MCHC RBC-ENTMCNC: 31.3 PG — SIGNIFICANT CHANGE UP (ref 27–34)
MCHC RBC-ENTMCNC: 32.4 GM/DL — SIGNIFICANT CHANGE UP (ref 32–36)
MCV RBC AUTO: 96.7 FL — SIGNIFICANT CHANGE UP (ref 80–100)
NRBC # BLD: 0 /100 WBCS — SIGNIFICANT CHANGE UP (ref 0–0)
PLATELET # BLD AUTO: 319 K/UL — SIGNIFICANT CHANGE UP (ref 150–400)
POTASSIUM SERPL-MCNC: 4.2 MMOL/L — SIGNIFICANT CHANGE UP (ref 3.5–5.3)
POTASSIUM SERPL-SCNC: 4.2 MMOL/L — SIGNIFICANT CHANGE UP (ref 3.5–5.3)
PROT CSF-MCNC: 26 MG/DL — SIGNIFICANT CHANGE UP (ref 15–45)
PROT SERPL-MCNC: 8.2 G/DL — SIGNIFICANT CHANGE UP (ref 6–8.3)
QUANT TB PLUS MITOGEN MINUS NIL: 3.5 IU/ML — SIGNIFICANT CHANGE UP
RBC # BLD: 4.18 M/UL — SIGNIFICANT CHANGE UP (ref 3.8–5.2)
RBC # FLD: 14.9 % — HIGH (ref 10.3–14.5)
SODIUM SERPL-SCNC: 134 MMOL/L — LOW (ref 135–145)
WBC # BLD: 22.01 K/UL — HIGH (ref 3.8–10.5)
WBC # FLD AUTO: 22.01 K/UL — HIGH (ref 3.8–10.5)

## 2021-10-02 PROCEDURE — 95720 EEG PHY/QHP EA INCR W/VEEG: CPT

## 2021-10-02 PROCEDURE — 99232 SBSQ HOSP IP/OBS MODERATE 35: CPT | Mod: GC

## 2021-10-02 RX ADMIN — GABAPENTIN 600 MILLIGRAM(S): 400 CAPSULE ORAL at 21:04

## 2021-10-02 RX ADMIN — ENOXAPARIN SODIUM 40 MILLIGRAM(S): 100 INJECTION SUBCUTANEOUS at 21:04

## 2021-10-02 RX ADMIN — Medication 1 MILLIGRAM(S): at 11:03

## 2021-10-02 RX ADMIN — CANNABIDIOL 100 MILLIGRAM(S): 100 SOLUTION ORAL at 07:52

## 2021-10-02 RX ADMIN — PANTOPRAZOLE SODIUM 40 MILLIGRAM(S): 20 TABLET, DELAYED RELEASE ORAL at 06:01

## 2021-10-02 RX ADMIN — Medication 0.2 MILLIGRAM(S): at 21:04

## 2021-10-02 RX ADMIN — CANNABIDIOL 100 MILLIGRAM(S): 100 SOLUTION ORAL at 16:23

## 2021-10-02 RX ADMIN — Medication 10 MILLIGRAM(S): at 21:04

## 2021-10-02 RX ADMIN — Medication 58 MILLIGRAM(S): at 06:01

## 2021-10-02 NOTE — EEG REPORT - NS EEG TEXT BOX
Day 2 – 	Start: 10/01/2021  08:00 	End: 10/02/2021  08:00 	Duration: 23 hr  58 min  Daily EEG Visual Analysis  FINDINGS:  The background was continuous, spontaneously variable and reactive. During wakefulness, the posterior dominant rhythm consisted of symmetric, well-modulated 10 Hz activity, with amplitude to 30 uV, that attenuated to eye opening.  Low amplitude frontal beta was noted in wakefulness.  Background Slowing: none  Focal Slowing:  None were present.  Sleep Background: Drowsiness was characterized by fragmentation, attenuation, and slowing of the background activity.   Sleep was characterized by the presence of vertex waves, symmetric sleep spindles and K-complexes.  Other Non-Epileptiform Findings: There is frequent lambda activity related to eye movements.  Activation Procedures:  Hyperventilation was not performed.   Photic stimulation was not performed.  Interictal Epileptiform Activity:  None were present.  Events: No events or seizures recorded.  Artifacts: Intermittent myogenic and movement artifacts were noted.  ECG: The heart rate on single channel ECG was predominantly between  BPM.  AEDs:   Cannabidiol 100 BID, Gabapentin 600 QD  EEG Summary:  Normal EEG in the awake, drowsy and asleep states. Lambda waves  Impression/Clinical Correlate:  This is an normal EEG record. Previously noted background slowing most likely due to frequent lambda waves, a normal finding. No epileptiform pattern or seizures were seen.   Charles Guevara MD, ELISABET Fellow, [facility]    Max Torres MD, PhD Director, Epilepsy Division, [facility]  ------------------------------------ EEG Reading Room: [phone] On Call Service After Hours: [phone]

## 2021-10-02 NOTE — PROGRESS NOTE ADULT - ASSESSMENT
25y R-handed F with h/o hashimoto's thyroiditis, autoimmune encephalitis and recently diagnosed SLE. On exam, she has no neurologic deficit.     IMPRESSION: Previously diagnosed autoimmune encephalitis of undetermined specific serology, perhaps secondary to Hashimoto vs. JT.  DDx perhaps including limbic encephalitis as well.     Plan  [] s/p LP 10/1  with repeat AIE panel including markers for synaptic dysfunction - YKL 40, SNAP 25, Neuro Granin  [] 24 hr vEEG to evaluate for slowing  [] 5 days of 1000mg methylprednisolone.    [] TPO thyroglobulin.    [] Continue sleep meds   [] No anti-epileptics for now as PT   [] Plan to repeat MRI brain w/wo w epilepsy protocol and at some point PET scan  [] PT will need Actemra after course of steroids

## 2021-10-02 NOTE — PROGRESS NOTE ADULT - SUBJECTIVE AND OBJECTIVE BOX
SUBJECTIVE: No events overnight    MEDICATIONS (HOME):  Home Medications:  alendronate 70 mg oral tablet: 1 tab(s) orally once a week (01 Oct 2021 22:11)  cannabidiol 100 mg/mL oral liquid: 0.5 milliliter(s) orally 2 times a day (01 Oct 2021 22:11)  cloNIDine 0.2 mg oral tablet: 1 tab(s) orally once a day (at bedtime) (01 Oct 2021 22:11)  folic acid 1 mg oral tablet: 1 tab(s) orally once a day except MTX days (01 Oct 2021 22:11)  gabapentin 600 mg oral tablet: 1 tab(s) orally once a day (01 Oct 2021 22:11)  IVI gram(s) intravenous every 2 weeks (01 Oct 2021 22:11)  Melatonin 10 mg oral capsule: 1 cap(s) orally once a day (at bedtime) (01 Oct 2021 22:11)  methotrexate 15 mg oral tablet: 1 tab(s) orally once a week (01 Oct 2021 22:11)  Plaquenil: 200 milligram(s) orally 2 times a day (01 Oct 2021 22:11)  predniSONE: 24 milligram(s) orally once a day (01 Oct 2021 22:11)  Strattera 60 mg oral capsule: 1 cap(s) orally once a day (in the morning) (01 Oct 2021 22:11)  Tri-Sprintec oral tablet: 1 tab(s) orally once a day (01 Oct 2021 22:11)    MEDICATIONS  (STANDING):  cannabidiol Oral Solution 100 milliGRAM(s) Oral two times a day with meals  cloNIDine 0.2 milliGRAM(s) Oral at bedtime  enoxaparin Injectable 40 milliGRAM(s) SubCutaneous at bedtime  folic acid 1 milliGRAM(s) Oral daily  gabapentin 600 milliGRAM(s) Oral daily  lidocaine 1% (Preservative-free) Injectable 10 milliLiter(s) Local Injection once  melatonin 10 milliGRAM(s) Oral at bedtime  methylPREDNISolone sodium succinate IVPB 1000 milliGRAM(s) IV Intermittent <User Schedule>  pantoprazole    Tablet 40 milliGRAM(s) Oral before breakfast    MEDICATIONS  (PRN):  LORazepam   Injectable 2 milliGRAM(s) IV Push once PRN For convulsion lasting >3 min    ALLERGIES/INTOLERANCES:  Allergies  Benadryl (Anaphylaxis)    Intolerances    VITALS & EXAMINATION:  Vital Signs Last 24 Hrs  T(C): 36.8 (02 Oct 2021 12:04), Max: 36.9 (01 Oct 2021 16:09)  T(F): 98.2 (02 Oct 2021 12:04), Max: 98.4 (01 Oct 2021 16:09)  HR: 98 (02 Oct 2021 12:04) (97 - 116)  BP: 128/76 (02 Oct 2021 12:04) (126/89 - 156/93)  BP(mean): --  RR: 18 (02 Oct 2021 12:04) (18 - 18)  SpO2: 97% (02 Oct 2021 12:04) (96% - 98%)    Neurological Exam:    MS: Awake, alert. Normal affect. Follows all commands, answering questions    Language: Speech is clear, fluent with good comprehension     CNs: eyes moving spontaneously in all directions. well developed masseter muscles b/l. No facial asymmetry b/l. Symmetric palate elevation in midline. Gag reflex deferred. Tongue midline, normal movements, no atrophy.    Motor: Normal muscle bulk & tone. No noticeable tremor or seizure. No pronator drift.  Moving all extremities equally without laterality         LABORATORY:  CBC                       13.1   22.01 )-----------( 319      ( 02 Oct 2021 07:38 )             40.4     Chem 10-02    134<L>  |  102  |  14  ----------------------------<  137<H>  4.2   |  15<L>  |  0.57    Ca    9.3      02 Oct 2021 07:36    TPro  8.2  /  Alb  4.2  /  TBili  0.3  /  DBili  x   /  AST  25  /  ALT  16  /  AlkPhos  40  10-02    LFTs LIVER FUNCTIONS - ( 02 Oct 2021 07:36 )  Alb: 4.2 g/dL / Pro: 8.2 g/dL / ALK PHOS: 40 U/L / ALT: 16 U/L / AST: 25 U/L / GGT: x           Coagulopathy   Lipid Panel   A1c   Cardiac enzymes     U/A

## 2021-10-03 ENCOUNTER — TRANSCRIPTION ENCOUNTER (OUTPATIENT)
Age: 26
End: 2021-10-03

## 2021-10-03 PROCEDURE — 95718 EEG PHYS/QHP 2-12 HR W/VEEG: CPT

## 2021-10-03 PROCEDURE — 99232 SBSQ HOSP IP/OBS MODERATE 35: CPT | Mod: GC

## 2021-10-03 RX ADMIN — CANNABIDIOL 100 MILLIGRAM(S): 100 SOLUTION ORAL at 16:55

## 2021-10-03 RX ADMIN — ENOXAPARIN SODIUM 40 MILLIGRAM(S): 100 INJECTION SUBCUTANEOUS at 21:20

## 2021-10-03 RX ADMIN — CANNABIDIOL 100 MILLIGRAM(S): 100 SOLUTION ORAL at 09:02

## 2021-10-03 RX ADMIN — PANTOPRAZOLE SODIUM 40 MILLIGRAM(S): 20 TABLET, DELAYED RELEASE ORAL at 05:20

## 2021-10-03 RX ADMIN — Medication 58 MILLIGRAM(S): at 05:20

## 2021-10-03 RX ADMIN — Medication 10 MILLIGRAM(S): at 21:19

## 2021-10-03 RX ADMIN — GABAPENTIN 600 MILLIGRAM(S): 400 CAPSULE ORAL at 21:20

## 2021-10-03 RX ADMIN — Medication 0.2 MILLIGRAM(S): at 21:20

## 2021-10-03 RX ADMIN — Medication 1 MILLIGRAM(S): at 11:03

## 2021-10-03 NOTE — EEG REPORT - NS EEG TEXT BOX
Day 3 – 	Start: 10/02/2021  08:00 	End: 10/03/2021  08:00 	Duration: 23 hr  58 min  Daily EEG Visual Analysis  FINDINGS:  The background was continuous, spontaneously variable and reactive. During wakefulness, the posterior dominant rhythm consisted of symmetric, well-modulated 10 Hz activity, with amplitude to 30 uV, that attenuated to eye opening.  Low amplitude frontal beta was noted in wakefulness.  Background Slowing: none  Focal Slowing:  None were present.  Sleep Background: Drowsiness was characterized by fragmentation, attenuation, and slowing of the background activity.   Sleep was characterized by the presence of vertex waves, symmetric sleep spindles and K-complexes.  Other Non-Epileptiform Findings: There is frequent lambda activity related to eye movements.  Activation Procedures:  Hyperventilation was not performed.   Photic stimulation was not performed.  Interictal Epileptiform Activity:  None were present.  Events: No events or seizures recorded.  Artifacts: Intermittent myogenic and movement artifacts were noted.  ECG: The heart rate on single channel ECG was predominantly between  BPM.  AEDs:   Cannabidiol 100 BID, Gabapentin 600 QD  EEG Summary:  Normal EEG in the awake, drowsy and asleep states. Lambda waves  Impression/Clinical Correlate:  This is an normal EEG record. Previously noted background slowing most likely due to frequent lambda waves, a normal finding. No epileptiform pattern or seizures were seen.   Charles Guevara MD, ELISABET Fellow, [facility]    Max Torres MD, PhD Director, Epilepsy Division, [facility]  ------------------------------------ EEG Reading Room: [phone] On Call Service After Hours: [phone]

## 2021-10-03 NOTE — PROGRESS NOTE ADULT - ATTENDING COMMENTS
AIE possible Hashimoto type    Lp completed - awaiting results  TPO 54, necroinflammatory markers YKL 40, SNAP 25, Ng( neuro granin)  continue solumedrol 1000 daily for 5 days post Lp - now day 2/5  continue EEG - if negative overnight may DC tomorrow.
AIE possible Hashimoto type    Lp completed - awaiting results  TPO 54, necroinflammatory markers YKL 40, SNAP 25, Ng( neuro granin)  continue solumedrol 1000 daily for 5 days post Lp - now day 2/5  discontinue EEG
AIE possible Hashimoto type    Lp today for autoimmune panel  TPO, TRP  necroinflammatory markers YKL 40, SNAP 25, Ng( neuro granin)  start solumedrol 1000 daily for 5 days post Lp.  continue EEG

## 2021-10-03 NOTE — DISCUSSION/SUMMARY
[FreeTextEntry1] : Patient presents for evaluation of AIE. patient. Patient with previous CSF analysis demonstrating elevation in TPO antibody and minor possibly beneath threshold evaluation for JT. Patient's wish is to be titrated off steroids as the negative side effects, are burdensome. Patient \par \par Plan\par - Admit to EMU\par - LP with CSF autoimmune panel ,further markers discovered since patient's last visit\par - connection to EEG for monitoring\par - plan to repeat MRI and PET\par - patient will likely need therapy with Actemra\par - plan for outpatient IVIG

## 2021-10-03 NOTE — EEG REPORT - NS EEG TEXT BOX
Day 4 – 	Start: 10/03/2021  08:00  	End: 10/03/2021  16:48  	Duration: 08 hr  48 min    Daily EEG Visual Analysis    FINDINGS:  The background was continuous, spontaneously variable and reactive. During wakefulness, the posterior dominant rhythm consisted of symmetric, well-modulated 10 Hz activity, with amplitude to 30 uV, that attenuated to eye opening.  Low amplitude frontal beta was noted in wakefulness.    Background Slowing:  none    Focal Slowing:   None were present.    Sleep Background:  Drowsiness was characterized by fragmentation, attenuation, and slowing of the background activity.    Sleep was characterized by the presence of vertex waves, symmetric sleep spindles and K-complexes.    Other Non-Epileptiform Findings:  There is frequent lambda activity related to eye movements.    Activation Procedures:   Hyperventilation was not performed.    Photic stimulation was not performed.    Interictal Epileptiform Activity:   None were present.    Events:  No events or seizures recorded.    Artifacts:  Intermittent myogenic and movement artifacts were noted.    ECG:  The heart rate on single channel ECG was predominantly between  BPM.    AEDs:    Cannabidiol 100 BID, Gabapentin 600 QD    EEG Summary:    Normal EEG in the awake, drowsy and asleep states.  Lambda waves    Impression/Clinical Correlate:    This is an normal EEG record. Previously noted background slowing most likely due to frequent lambda waves, a normal finding. No epileptiform pattern or seizures were seen.      Charles Guevara MD, ELISABET  Fellow, [facility]        ------------------------------------  EEG Reading Room: [phone]  On Call Service After Hours: [phone]

## 2021-10-03 NOTE — PROGRESS NOTE ADULT - SUBJECTIVE AND OBJECTIVE BOX
MRN-[id number]    Subjective: 26 yo female seen and examined at bedside. No events overnight.    PAST MEDICAL & SURGICAL HISTORY:  Hashimoto's thyroiditis    Autoimmune encephalitis    Systemic lupus erythematosus    No significant past surgical history    FAMILY HISTORY:  FH: HTN (hypertension) (Father)    Social Hx:  Nonsmoker, no drug or alcohol use    Home Medications:  alendronate 70 mg oral tablet: 1 tab(s) orally once a week (01 Oct 2021 22:11)  cannabidiol 100 mg/mL oral liquid: 0.5 milliliter(s) orally 2 times a day (01 Oct 2021 22:11)  cloNIDine 0.2 mg oral tablet: 1 tab(s) orally once a day (at bedtime) (01 Oct 2021 22:11)  folic acid 1 mg oral tablet: 1 tab(s) orally once a day except MTX days (01 Oct 2021 22:11)  gabapentin 600 mg oral tablet: 1 tab(s) orally once a day (01 Oct 2021 22:11)  IVI gram(s) intravenous every 2 weeks (01 Oct 2021 22:11)  Melatonin 10 mg oral capsule: 1 cap(s) orally once a day (at bedtime) (01 Oct 2021 22:11)  methotrexate 15 mg oral tablet: 1 tab(s) orally once a week (01 Oct 2021 22:11)  Plaquenil: 200 milligram(s) orally 2 times a day (01 Oct 2021 22:11)  predniSONE: 24 milligram(s) orally once a day (01 Oct 2021 22:11)  Strattera 60 mg oral capsule: 1 cap(s) orally once a day (in the morning) (01 Oct 2021 22:11)  Tri-Sprintec oral tablet: 1 tab(s) orally once a day (01 Oct 2021 22:11)    MEDICATIONS  (STANDING):  cannabidiol Oral Solution 100 milliGRAM(s) Oral two times a day with meals  cloNIDine 0.2 milliGRAM(s) Oral at bedtime  enoxaparin Injectable 40 milliGRAM(s) SubCutaneous at bedtime  folic acid 1 milliGRAM(s) Oral daily  gabapentin 600 milliGRAM(s) Oral daily  lidocaine 1% (Preservative-free) Injectable 10 milliLiter(s) Local Injection once  melatonin 10 milliGRAM(s) Oral at bedtime  methylPREDNISolone sodium succinate IVPB 1000 milliGRAM(s) IV Intermittent <User Schedule>  pantoprazole    Tablet 40 milliGRAM(s) Oral before breakfast    MEDICATIONS  (PRN):  LORazepam   Injectable 2 milliGRAM(s) IV Push once PRN For convulsion lasting >3 min    Allergies  Benadryl (Anaphylaxis)    ROS: Pertinent positives above, all other ROS were reviewed and are negative.      Vital Signs Last 24 Hrs  T(C): 36.8 (03 Oct 2021 11:00), Max: 36.9 (02 Oct 2021 19:26)  T(F): 98.2 (03 Oct 2021 11:00), Max: 98.4 (02 Oct 2021 19:26)  HR: 102 (03 Oct 2021 11:00) (99 - 115)  BP: 128/79 (03 Oct 2021 11:00) (122/71 - 156/83)  RR: 18 (03 Oct 2021 11:00) (18 - 18)  SpO2: 98% (03 Oct 2021 11:00) (96% - 99%)    GENERAL EXAM:  Constitutional: Lying in bed, NAD.  Head: Normocephalic, atraumatic.  Extremities: No edema.    NEUROLOGICAL EXAM:  MS: Alert, eyes open spontaneously. Oriented to self, location, year. Speech is fluent, not slurred. Follows commands.  CN: EOMI. Face symmetric.  Motor: All extremities antigravity.  Sensory: Intact to LT throughout.  Coordination/Gait: Not assessed.    Labs:   cbc                      13.1   22.01 )-----------( 319      ( 02 Oct 2021 07:38 )             40.4     [id number]    134<L>  |  102  |  14  ----------------------------<  137<H>  4.2   |  15<L>  |  0.57    Ca    9.3      02 Oct 2021 07:36    TPro  8.2  /  Alb  4.2  /  TBili  0.3  /  DBili  x   /  AST  25  /  ALT  16  /  AlkPhos  40  10-02    LIVER FUNCTIONS - ( 02 Oct 2021 07:36 )  Alb: 4.2 g/dL / Pro: 8.2 g/dL / ALK PHOS: 40 U/L / ALT: 16 U/L / AST: 25 U/L / GGT: x           Radiology:   MRN-[id number]    Subjective: 24 yo female seen and examined at bedside. No events overnight.    PAST MEDICAL & SURGICAL HISTORY:  Hashimoto's thyroiditis    Autoimmune encephalitis    Systemic lupus erythematosus    No significant past surgical history    FAMILY HISTORY:  FH: HTN (hypertension) (Father)    Social Hx:  Nonsmoker, no drug or alcohol use    Home Medications:  alendronate 70 mg oral tablet: 1 tab(s) orally once a week (01 Oct 2021 22:11)  cannabidiol 100 mg/mL oral liquid: 0.5 milliliter(s) orally 2 times a day (01 Oct 2021 22:11)  cloNIDine 0.2 mg oral tablet: 1 tab(s) orally once a day (at bedtime) (01 Oct 2021 22:11)  folic acid 1 mg oral tablet: 1 tab(s) orally once a day except MTX days (01 Oct 2021 22:11)  gabapentin 600 mg oral tablet: 1 tab(s) orally once a day (01 Oct 2021 22:11)  IVI gram(s) intravenous every 2 weeks (01 Oct 2021 22:11)  Melatonin 10 mg oral capsule: 1 cap(s) orally once a day (at bedtime) (01 Oct 2021 22:11)  methotrexate 15 mg oral tablet: 1 tab(s) orally once a week (01 Oct 2021 22:11)  Plaquenil: 200 milligram(s) orally 2 times a day (01 Oct 2021 22:11)  predniSONE: 24 milligram(s) orally once a day (01 Oct 2021 22:11)  Strattera 60 mg oral capsule: 1 cap(s) orally once a day (in the morning) (01 Oct 2021 22:11)  Tri-Sprintec oral tablet: 1 tab(s) orally once a day (01 Oct 2021 22:11)    MEDICATIONS  (STANDING):  cannabidiol Oral Solution 100 milliGRAM(s) Oral two times a day with meals  cloNIDine 0.2 milliGRAM(s) Oral at bedtime  enoxaparin Injectable 40 milliGRAM(s) SubCutaneous at bedtime  folic acid 1 milliGRAM(s) Oral daily  gabapentin 600 milliGRAM(s) Oral daily  lidocaine 1% (Preservative-free) Injectable 10 milliLiter(s) Local Injection once  melatonin 10 milliGRAM(s) Oral at bedtime  methylPREDNISolone sodium succinate IVPB 1000 milliGRAM(s) IV Intermittent <User Schedule>  pantoprazole    Tablet 40 milliGRAM(s) Oral before breakfast    MEDICATIONS  (PRN):  LORazepam   Injectable 2 milliGRAM(s) IV Push once PRN For convulsion lasting >3 min    Allergies  Benadryl (Anaphylaxis)    ROS: Pertinent positives above, all other ROS were reviewed and are negative.      Vital Signs Last 24 Hrs  T(C): 36.8 (03 Oct 2021 11:00), Max: 36.9 (02 Oct 2021 19:26)  T(F): 98.2 (03 Oct 2021 11:00), Max: 98.4 (02 Oct 2021 19:26)  HR: 102 (03 Oct 2021 11:00) (99 - 115)  BP: 128/79 (03 Oct 2021 11:00) (122/71 - 156/83)  RR: 18 (03 Oct 2021 11:00) (18 - 18)  SpO2: 98% (03 Oct 2021 11:00) (96% - 99%)    GENERAL EXAM:  Constitutional: Lying in bed, NAD.  Head: Normocephalic, atraumatic.  Extremities: No edema.    NEUROLOGICAL EXAM:  MS: Alert, eyes open spontaneously. Oriented to self, location, year. Speech is fluent, not slurred. Follows commands.  CN: EOMI. Face symmetric.  Motor: All extremities antigravity.  Sensory: Intact to LT throughout.  Coordination/Gait: Not assessed.    Labs:   cbc                      13.1   22.01 )-----------( 319      ( 02 Oct 2021 07:38 )             40.4     [id number]    134<L>  |  102  |  14  ----------------------------<  137<H>  4.2   |  15<L>  |  0.57    Ca    9.3      02 Oct 2021 07:36    TPro  8.2  /  Alb  4.2  /  TBili  0.3  /  DBili  x   /  AST  25  /  ALT  16  /  AlkPhos  40  10-02    LIVER FUNCTIONS - ( 02 Oct 2021 07:36 )  Alb: 4.2 g/dL / Pro: 8.2 g/dL / ALK PHOS: 40 U/L / ALT: 16 U/L / AST: 25 U/L / GGT: x           Radiology/EEG:  -10/01 EEG Summary:  Abnormal EEG in the awake, drowsy and asleep states.  Background slowing, generalized  Impression/Clinical Correlate:  This is an abnormal EEG record due to presence of mild nonspecific diffuse or multifocal cerebral dysfunction.   No epileptiform pattern or seizures were seen.    -10/02 EEG Summary:  Normal EEG in the awake, drowsy and asleep states.  2.	Lambda waves  Impression/Clinical Correlate:  This is an normal EEG record. Previously noted background slowing most likely due to frequent lambda waves, a normal finding. No epileptiform pattern or seizures were seen.

## 2021-10-03 NOTE — PROGRESS NOTE ADULT - ASSESSMENT
Assessment: 26 yo RH female with a PMHx of hashimoto's thyroiditis, autoimmune encephalitis, and recently diagnosed SLE. On exam, she has no neurologic deficits.     IMPRESSION: Previously diagnosed autoimmune encephalitis of undetermined specific serology, perhaps secondary to Hashimoto vs. JT. DDx perhaps including limbic encephalitis as well.     Plan:  [x] DC vEEG.  [] Neuro checks Q4HR.  [] Seizure precautions.  [x] s/p LP 10/1 with repeat AIE panel including markers for synaptic dysfunction - YKL 40, SNAP 25, Neuro Granin.  [] Solumedrol 1G IV x5 days (day 3/5).   [] TPO thyroglobulin.    [] Continue Clonadine 0.2MG PO QHS, Melatonin 10MG PO QHS.  [] Epidiolex 100MG PO BID.  [] Continue Gabapentin 600MG PO QD.  [] Plan to repeat MRI brain w/wo w epilepsy protocol, and at some point PET scan.  [] Send JT Ab serum, AIE panel serum.  [] PT will need Actemra after course of steroids.  [] DVT PPx: Lovenox 40MG SC QD.  [] Diet: Regular.    Case discussed with epilepsy attending Dr. Torres.

## 2021-10-03 NOTE — HISTORY OF PRESENT ILLNESS
[FreeTextEntry1] : Patient is a 25 year old female past medical history significant for AIE presenting for further evaluation and management of AIE. Patient reports she began suffering from symptoms at age 18 years. symptoms initially presented as insomnia. Patient began to experience seizure events.  Patient began to demonstrate poor functioning in school, reporting poor concentration and poor working memory skills Patient reports having to partially cover large blocks of text in order to be able to comprehend the information contained therein. Patient was diagnosed with Hashimoto's encephalopathy. TPO antibodies were seen to be elevated. CSF analysis basic studies were normal. CSF AIE panel demonstrated rising TPO, minor elevation in JT no other abnormalities.  PET scan done in June 2020, demonstrated cerebellar hypometabolism.  Patient was treated with steroids, IVIG. and rituximab. symptoms began to improve. Steroids were oral daily, IVIG was monthly, and Rituximab dosed intermittently. Patient reports completing college. From [id number] patient was treated with oral prednisone and intermittent dosing on rituximab based on CD 20 counts. Patient was latter followed by another doctor, who favored a diagnosis of functional neurological disorder. Patient was treated with low dose of methotrexate, but tolerated treatment poorly. \par \par Patient is currently on prednisone 24 daily. Patient is dependant on this dose, with symptoms reemerging when any further downtitration. Currently, patient reports sensitivity to light and loud sounds. \par

## 2021-10-04 ENCOUNTER — TRANSCRIPTION ENCOUNTER (OUTPATIENT)
Age: 26
End: 2021-10-04

## 2021-10-04 LAB
ACE SERPL-CCNC: 20 U/L — SIGNIFICANT CHANGE UP (ref 14–82)
ALBUMIN CSF-MCNC: 12.1 MG/DL — LOW (ref 14–25)
GAD65 AB SER-MCNC: 0.18 NMOL/L — HIGH
IGG CSF-MCNC: 4.1 MG/DL — HIGH
IGG/ALB CSF: 0.34 RATIO — HIGH
MBP CSF-MCNC: 3.4 NG/ML — HIGH (ref 0–2.9)

## 2021-10-04 PROCEDURE — 70553 MRI BRAIN STEM W/O & W/DYE: CPT | Mod: 26

## 2021-10-04 PROCEDURE — 99233 SBSQ HOSP IP/OBS HIGH 50: CPT

## 2021-10-04 RX ORDER — TOCILIZUMAB 20 MG/ML
800 INJECTION, SOLUTION, CONCENTRATE INTRAVENOUS ONCE
Refills: 0 | Status: COMPLETED | OUTPATIENT
Start: 2021-10-05 | End: 2021-10-05

## 2021-10-04 RX ORDER — PENICILLIN G BENZATHINE 1200000 [IU]/2ML
2.4 INJECTION, SUSPENSION INTRAMUSCULAR
Refills: 0 | Status: DISCONTINUED | OUTPATIENT
Start: 2021-10-04 | End: 2021-10-05

## 2021-10-04 RX ADMIN — Medication 58 MILLIGRAM(S): at 18:12

## 2021-10-04 RX ADMIN — Medication 0.2 MILLIGRAM(S): at 21:24

## 2021-10-04 RX ADMIN — ENOXAPARIN SODIUM 40 MILLIGRAM(S): 100 INJECTION SUBCUTANEOUS at 21:24

## 2021-10-04 RX ADMIN — CANNABIDIOL 100 MILLIGRAM(S): 100 SOLUTION ORAL at 18:13

## 2021-10-04 RX ADMIN — CANNABIDIOL 100 MILLIGRAM(S): 100 SOLUTION ORAL at 08:29

## 2021-10-04 RX ADMIN — PANTOPRAZOLE SODIUM 40 MILLIGRAM(S): 20 TABLET, DELAYED RELEASE ORAL at 04:46

## 2021-10-04 RX ADMIN — Medication 1 MILLIGRAM(S): at 12:38

## 2021-10-04 RX ADMIN — PENICILLIN G BENZATHINE 2.4 MILLION UNIT(S): 1200000 INJECTION, SUSPENSION INTRAMUSCULAR at 18:06

## 2021-10-04 RX ADMIN — GABAPENTIN 600 MILLIGRAM(S): 400 CAPSULE ORAL at 21:24

## 2021-10-04 RX ADMIN — Medication 10 MILLIGRAM(S): at 21:24

## 2021-10-04 NOTE — DISCHARGE NOTE PROVIDER - NSFOLLOWUPCLINICS_GEN_ALL_ED_FT
[facility] - Infectious Disease  Infectious Disease  [address], [place] [address]  Phone: [phone]  Fax:   Follow Up Time: 1 month

## 2021-10-04 NOTE — DISCHARGE NOTE PROVIDER - CARE PROVIDER_API CALL
Teena Tineo)  EEGEpilepsy; Neurology  [address], [address]  [place] [address]  Phone: [phone]  Fax: ()-  Follow Up Time:    Teena Tineo)  EEGEpilepsy; Neurology  [address], [address]  [place] [address]  Phone: [phone]  Fax: ()-  Follow Up Time: 2 weeks

## 2021-10-04 NOTE — CONSULT NOTE ADULT - SUBJECTIVE AND OBJECTIVE BOX
HPI:   Patient is a 25y female with PMH of AIE dx at age 18, has been on steroids ever since along with IVIG twice a month, has had Rituximab as well, dx with Lupus about 6 months ago & has been on Plaquenil & Methotrexate, hx of Hashimoto's encephalopathy in 2013 and TPO antibodies were reportedly elevated, came from [place] for better care of her AIE. Her AIE symptoms include major insomnia & seizure episodes.    Started on high dose steroids. However, her RPR & FTA turned out positive & ID called.      REVIEW OF SYSTEMS:  All other review of systems negative (Comprehensive ROS) except as above     PAST MEDICAL & SURGICAL HISTORY:  Hashimoto's thyroiditis  Autoimmune encephalitis  Systemic lupus erythematosus  No significant past surgical history        Allergies  Benadryl (Anaphylaxis)  Intolerances        Antimicrobials Day #  :    Other Medications:  cannabidiol Oral Solution 100 milliGRAM(s) Oral two times a day with meals  cloNIDine 0.2 milliGRAM(s) Oral at bedtime  enoxaparin Injectable 40 milliGRAM(s) SubCutaneous at bedtime  folic acid 1 milliGRAM(s) Oral daily  gabapentin 600 milliGRAM(s) Oral daily  lidocaine 1% (Preservative-free) Injectable 10 milliLiter(s) Local Injection once  LORazepam   Injectable 2 milliGRAM(s) IV Push once PRN  melatonin 10 milliGRAM(s) Oral at bedtime  pantoprazole    Tablet 40 milliGRAM(s) Oral before breakfast      FAMILY HISTORY:  FH: HTN (hypertension) (Father)        SOCIAL HISTORY:  Smoking: No    ETOH: No    Drug Use: No  Single     T(F): 98.2 (10-04-21 @ 11:57), Max: 98.5 (10-03-21 @ 23:19)  HR: 105 (10-04-21 @ 11:57)  BP: 135/95 (10-04-21 @ 11:57)  RR: 18 (10-04-21 @ 11:57)  SpO2: 97% (10-04-21 @ 11:57)  Wt(kg): --    PHYSICAL EXAM:  General: alert, no acute distress  Eyes:  anicteric, no conjunctival injection, no discharge  Neck: supple  Lungs: clear to auscultation  Heart: regular rate and rhythm; no murmurs  Abdomen: soft, nondistended, nontender, without mass or organomegaly  Skin: no lesions  Extremities: no clubbing, cyanosis, or edema  Neurologic: alert, oriented, moves all extremities    LAB RESULTS:        MICROBIOLOGY:  RECENT CULTURES:        RADIOLOGY REVIEWED:

## 2021-10-04 NOTE — DISCHARGE NOTE PROVIDER - HOSPITAL COURSE
HPI   25-y/o R-HF w a h/o Hashimoto thyroiditis (2013), unspecified AIE (2007) and recently diagnosed SLE (12/2020) presenting for further evaluation and management of AIE.  Symptom onset started at age 18.  Initially had insomnia which then progressed to spells vs seizure events.  Describes a sudden 15-30 second generalized convulsion a/w post-event prolonged staring, speech arrest sometimes lasting up to 30 min.  At times she has bowel incontinence.  No tongue bite or urinary incontinence.  She can remain confused up to 1 day afterwards.  Last episode 05/2020. Anti-seizure meds on admission included CBD 500mg daily and gabapentin 600 mg daily.  Further manifestations of her AIE included poor functioning in school, reporting poor concentration and poor working memory skills.  PT states she had been dx w/ Hashimoto's encephalopathy in 2013 and TPO antibodies were reportedly elevated.  Per PT, CSF analysis basic studies were normal. CSF AIE panel demonstrated rising TPO, minor elevation in JT no other abnormalities. PET scan done in June 2020, demonstrated cerebellar hypometabolism.     PT's current AIE regiment includes IVIG 70g q2 weeks (last 09/22/21), prednisone 24mg daily.  Was also on rituximab but no longer taking.  She recently had a possible lupus diagnosis, for which she was started on Plaquenil 400mg QD and methotrexate 15 mg.  Had last had attempt of down-titration of prednisone and IVIG but then had re-emergence of spells/seizures.      Of note, when asked regarding her tachycardia, states that she always runs tachycardic and slightly HTN including before her AIE diagnosis. Is followed for this by her PCP who is just monitoring her but no current treatment.       HOSPITAL COURSE  Patient was admitted to the neurology service. Patient had bedside lumbar puncture and then was started on IV Solumedrol x 5 days. However patient tested positive for Treponema Pallidum antibodies and FTA confirmatory test was reactive. IV Solumedrol was held for concern of causing and immunocompromised state in the setting of positive Syphilis antibodies. Patient was monitored on vEEG and initial read showed background slowing but subsequent day reads showed normal EEG as below. ID was consulted and recommended _____.         EEG Summary:    Normal EEG in the awake, drowsy and asleep states.  Lambda waves    Impression/Clinical Correlate:    This is an normal EEG record. Previously noted background slowing most likely due to frequent lambda waves, a normal finding. No epileptiform pattern or seizures were seen. HPI   25-y/o R-HF w a h/o Hashimoto thyroiditis (2013), unspecified AIE (2007) and recently diagnosed SLE (12/2020) presenting for further evaluation and management of AIE.  Symptom onset started at age 18.  Initially had insomnia which then progressed to spells vs seizure events.  Describes a sudden 15-30 second generalized convulsion a/w post-event prolonged staring, speech arrest sometimes lasting up to 30 min.  At times she has bowel incontinence.  No tongue bite or urinary incontinence.  She can remain confused up to 1 day afterwards.  Last episode 05/2020. Anti-seizure meds on admission included CBD 500mg daily and gabapentin 600 mg daily.  Further manifestations of her AIE included poor functioning in school, reporting poor concentration and poor working memory skills.  PT states she had been dx w/ Hashimoto's encephalopathy in 2013 and TPO antibodies were reportedly elevated.  Per PT, CSF analysis basic studies were normal. CSF AIE panel demonstrated rising TPO, minor elevation in JT no other abnormalities. PET scan done in June 2020, demonstrated cerebellar hypometabolism.     PT's current AIE regiment includes IVIG 70g q2 weeks (last 09/22/21), prednisone 24mg daily.  Was also on rituximab but no longer taking.  She recently had a possible lupus diagnosis, for which she was started on Plaquenil 400mg QD and methotrexate 15 mg.  Had last had attempt of down-titration of prednisone and IVIG but then had re-emergence of spells/seizures.      Of note, when asked regarding her tachycardia, states that she always runs tachycardic and slightly HTN including before her AIE diagnosis. Is followed for this by her PCP who is just monitoring her but no current treatment.       HOSPITAL COURSE  Patient was admitted to the neurology service. Patient had bedside lumbar puncture and then was started on IV Solumedrol x 2 days. Autoimmune encephalitis panel sent (CSF and serum). However patient tested positive for Treponema Pallidum antibodies and FTA confirmatory test was reactive. IV Solumedrol was held for concern of causing and immunocompromised state in the setting of positive Syphilis antibodies. Pt continued on long prednisone taper decreased weekly (60mg/40mg/20mg). ID was consulted and recommended ID and treatment started for latent syphilis with 3 intramuscular injections of Benzathine PCN 2.4 mU x 3 doses weekly, s/p first dose on 10/4/21.   Patient was monitored on vEEG and initial read showed background slowing but subsequent day reads showed normal EEG as below. Pt started on Actemera on 10/5/21. Pt discharged on home epidiolex, gabapentin, and clonidine.       EEG Summary:    Normal EEG in the awake, drowsy and asleep states.  Lambda waves    Impression/Clinical Correlate:    This is an normal EEG record. Previously noted background slowing most likely due to frequent lambda waves, a normal finding. No epileptiform pattern or seizures were seen. HPI   25-y/o R-HF w a h/o Hashimoto thyroiditis (2013), unspecified AIE (2007) and recently diagnosed SLE (12/2020) presenting for further evaluation and management of AIE.  Symptom onset started at age 18.  Initially had insomnia which then progressed to spells vs seizure events.  Describes a sudden 15-30 second generalized convulsion a/w post-event prolonged staring, speech arrest sometimes lasting up to 30 min.  At times she has bowel incontinence.  No tongue bite or urinary incontinence.  She can remain confused up to 1 day afterwards.  Last episode 05/2020. Anti-seizure meds on admission included CBD 500mg daily and gabapentin 600 mg daily.  Further manifestations of her AIE included poor functioning in school, reporting poor concentration and poor working memory skills.  PT states she had been dx w/ Hashimoto's encephalopathy in 2013 and TPO antibodies were reportedly elevated.  Per PT, CSF analysis basic studies were normal. CSF AIE panel demonstrated rising TPO, minor elevation in JT no other abnormalities. PET scan done in June 2020, demonstrated cerebellar hypometabolism.     PT's current AIE regiment includes IVIG 70g q2 weeks (last 09/22/21), prednisone 24mg daily.  Was also on rituximab but no longer taking.  She recently had a possible lupus diagnosis, for which she was started on Plaquenil 400mg QD and methotrexate 15 mg.  Had last had attempt of down-titration of prednisone and IVIG but then had re-emergence of spells/seizures.      Of note, when asked regarding her tachycardia, states that she always runs tachycardic and slightly HTN including before her AIE diagnosis. Is followed for this by her PCP who is just monitoring her but no current treatment.       HOSPITAL COURSE  Patient was admitted to the neurology service. Patient had bedside lumbar puncture and then was started on IV Solumedrol x 2 days. Autoimmune encephalitis panel sent (CSF and serum). However patient tested positive for Treponema Pallidum antibodies and FTA confirmatory test was reactive. IV Solumedrol was held for concern of causing and immunocompromised state in the setting of positive Syphilis antibodies. Pt continued on long prednisone taper decreased weekly (60mg/40mg/20mg). ID was consulted and recommended ID and treatment started for latent syphilis with 3 intramuscular injections of Benzathine PCN 2.4 mU x 3 doses weekly, s/p first dose on 10/4/21.   Patient was monitored on vEEG and initial read showed background slowing but subsequent day reads showed normal EEG as below. Pt given a dose of Actemra on 10/5/21. Pt discharged on home epidiolex, gabapentin, and clonidine.       EEG Summary:    Normal EEG in the awake, drowsy and asleep states.  Lambda waves    Impression/Clinical Correlate:    This is an normal EEG record. Previously noted background slowing most likely due to frequent lambda waves, a normal finding. No epileptiform pattern or seizures were seen.

## 2021-10-04 NOTE — DISCHARGE NOTE PROVIDER - NSDCCPCAREPLAN_GEN_ALL_CORE_FT
PRINCIPAL DISCHARGE DIAGNOSIS  Diagnosis: Seizures  Assessment and Plan of Treatment:        PRINCIPAL DISCHARGE DIAGNOSIS  Diagnosis: Seizures  Assessment and Plan of Treatment: We monitored you on EEG (electroencephalogram) and did not find any seizure activity. Please continue taking your seizure medications as prescribed. You will have a telehealth visit with Dr. Tineo in 2 weeks.  1. [facility] law mandates you to self-report your seizure disorder to [facility], and be seizure-free for 1yr before you can drive.   2. Avoid swimming, diving, taking a bath, cooking, use of motarized machineries.  3. Avoid climbing a ladder, trees or any height.  4. Use machines with safety switches.  5. Always be aware of your surroundings and make sure family and friends are aware of your seizures.  6. Use non-breakable dishes.  7. Consider wearing a medical bracelet to inform people you have epilepsy in case of an emergency      SECONDARY DISCHARGE DIAGNOSES  Diagnosis: Syphilis, latent  Assessment and Plan of Treatment: You tested positive in the blood for syphilis, including the FTA confirmatory test. You were seen by the infectious disease doctor who started you on penicillin for latent syphilis. You will need to take 2 more doses of penicillin weekly (10/11/21 and 10/18/21) to complete the treatment.

## 2021-10-04 NOTE — PROGRESS NOTE ADULT - SUBJECTIVE AND OBJECTIVE BOX
SUBJECTIVE: No acute events overnight. Solumedrol stopped and pt taken off EEG.    PAST MEDICAL & SURGICAL HISTORY:  Hashimoto&#x27;s thyroiditis    Autoimmune encephalitis    Systemic lupus erythematosus    No significant past surgical history      FAMILY HISTORY:  FH: HTN (hypertension) (Father)      SOCIAL HISTORY:   T/E/D:   Occupation:   Lives with:     MEDICATIONS (HOME):  Home Medications:  alendronate 70 mg oral tablet: 1 tab(s) orally once a week (01 Oct 2021 22:11)  cannabidiol 100 mg/mL oral liquid: 0.5 milliliter(s) orally 2 times a day (01 Oct 2021 22:11)  cloNIDine 0.2 mg oral tablet: 1 tab(s) orally once a day (at bedtime) (01 Oct 2021 22:11)  folic acid 1 mg oral tablet: 1 tab(s) orally once a day except MTX days (01 Oct 2021 22:11)  gabapentin 600 mg oral tablet: 1 tab(s) orally once a day (01 Oct 2021 22:11)  IVI gram(s) intravenous every 2 weeks (01 Oct 2021 22:11)  Melatonin 10 mg oral capsule: 1 cap(s) orally once a day (at bedtime) (01 Oct 2021 22:11)  methotrexate 15 mg oral tablet: 1 tab(s) orally once a week (01 Oct 2021 22:11)  Plaquenil: 200 milligram(s) orally 2 times a day (01 Oct 2021 22:11)  predniSONE: 24 milligram(s) orally once a day (01 Oct 2021 22:11)  Strattera 60 mg oral capsule: 1 cap(s) orally once a day (in the morning) (01 Oct 2021 22:11)  Tri-Sprintec oral tablet: 1 tab(s) orally once a day (01 Oct 2021 22:11)    MEDICATIONS  (STANDING):  cannabidiol Oral Solution 100 milliGRAM(s) Oral two times a day with meals  cloNIDine 0.2 milliGRAM(s) Oral at bedtime  enoxaparin Injectable 40 milliGRAM(s) SubCutaneous at bedtime  folic acid 1 milliGRAM(s) Oral daily  gabapentin 600 milliGRAM(s) Oral daily  lidocaine 1% (Preservative-free) Injectable 10 milliLiter(s) Local Injection once  melatonin 10 milliGRAM(s) Oral at bedtime  pantoprazole    Tablet 40 milliGRAM(s) Oral before breakfast    MEDICATIONS  (PRN):  LORazepam   Injectable 2 milliGRAM(s) IV Push once PRN For convulsion lasting >3 min    ALLERGIES/INTOLERANCES:  Allergies  Benadryl (Anaphylaxis)    Intolerances    VITALS & EXAMINATION:  Vital Signs Last 24 Hrs  T(C): 36.8 (04 Oct 2021 07:08), Max: 36.9 (03 Oct 2021 23:19)  T(F): 98.2 (04 Oct 2021 07:08), Max: 98.5 (03 Oct 2021 23:19)  HR: 97 (04 Oct 2021 07:08) (84 - 110)  BP: 122/84 (04 Oct 2021 07:08) (122/84 - 157/95)  BP(mean): --  RR: 18 (04 Oct 2021 07:08) (18 - 18)  SpO2: 96% (04 Oct 2021 07:08) (95% - 98%)    General:  Constitutional: Lying in bed, NAD.  Head: Normocephalic, atraumatic.  Extremities: No edema.    NEUROLOGICAL EXAM:  MS: Alert, eyes open spontaneously. Oriented to self, location, year. Speech is fluent, not slurred. Follows commands.  CN: EOMI. Face symmetric.  Motor: All extremities antigravity.  Sensory: Intact to LT throughout.  Coordination/Gait: Not assessed.    LABORATORY:      STUDIES & IMAGING:  Studies (EKG, EEG, EMG, etc):     Radiology (XR, CT, MR, U/S, TTE/JAMISON): SUBJECTIVE: No acute events overnight. Solumedrol stopped and pt taken off EEG. Pt aware of her possible diagnosis of syphillis    PAST MEDICAL & SURGICAL HISTORY:  Hashimoto&#x27;s thyroiditis    Autoimmune encephalitis    Systemic lupus erythematosus    No significant past surgical history      FAMILY HISTORY:  FH: HTN (hypertension) (Father)      SOCIAL HISTORY:   T/E/D:   Occupation:   Lives with:     MEDICATIONS (HOME):  Home Medications:  alendronate 70 mg oral tablet: 1 tab(s) orally once a week (01 Oct 2021 22:11)  cannabidiol 100 mg/mL oral liquid: 0.5 milliliter(s) orally 2 times a day (01 Oct 2021 22:11)  cloNIDine 0.2 mg oral tablet: 1 tab(s) orally once a day (at bedtime) (01 Oct 2021 22:11)  folic acid 1 mg oral tablet: 1 tab(s) orally once a day except MTX days (01 Oct 2021 22:11)  gabapentin 600 mg oral tablet: 1 tab(s) orally once a day (01 Oct 2021 22:11)  IVI gram(s) intravenous every 2 weeks (01 Oct 2021 22:11)  Melatonin 10 mg oral capsule: 1 cap(s) orally once a day (at bedtime) (01 Oct 2021 22:11)  methotrexate 15 mg oral tablet: 1 tab(s) orally once a week (01 Oct 2021 22:11)  Plaquenil: 200 milligram(s) orally 2 times a day (01 Oct 2021 22:11)  predniSONE: 24 milligram(s) orally once a day (01 Oct 2021 22:11)  Strattera 60 mg oral capsule: 1 cap(s) orally once a day (in the morning) (01 Oct 2021 22:11)  Tri-Sprintec oral tablet: 1 tab(s) orally once a day (01 Oct 2021 22:11)    MEDICATIONS  (STANDING):  cannabidiol Oral Solution 100 milliGRAM(s) Oral two times a day with meals  cloNIDine 0.2 milliGRAM(s) Oral at bedtime  enoxaparin Injectable 40 milliGRAM(s) SubCutaneous at bedtime  folic acid 1 milliGRAM(s) Oral daily  gabapentin 600 milliGRAM(s) Oral daily  lidocaine 1% (Preservative-free) Injectable 10 milliLiter(s) Local Injection once  melatonin 10 milliGRAM(s) Oral at bedtime  pantoprazole    Tablet 40 milliGRAM(s) Oral before breakfast    MEDICATIONS  (PRN):  LORazepam   Injectable 2 milliGRAM(s) IV Push once PRN For convulsion lasting >3 min    ALLERGIES/INTOLERANCES:  Allergies  Benadryl (Anaphylaxis)    Intolerances    VITALS & EXAMINATION:  Vital Signs Last 24 Hrs  T(C): 36.8 (04 Oct 2021 07:08), Max: 36.9 (03 Oct 2021 23:19)  T(F): 98.2 (04 Oct 2021 07:08), Max: 98.5 (03 Oct 2021 23:19)  HR: 97 (04 Oct 2021 07:08) (84 - 110)  BP: 122/84 (04 Oct 2021 07:08) (122/84 - 157/95)  BP(mean): --  RR: 18 (04 Oct 2021 07:08) (18 - 18)  SpO2: 96% (04 Oct 2021 07:08) (95% - 98%)    General:  Constitutional: Lying in bed, NAD.  Head: Normocephalic, atraumatic.  Extremities: No edema.    NEUROLOGICAL EXAM:  MS: Alert, eyes open spontaneously. Oriented to self, location, year. Speech is fluent, not slurred. Follows commands.  CN: PERRL 5mm b/l. EOMI. Face symmetric.  Motor: All extremities antigravity, no pronator drift  Sensory: Intact to LT throughout.  Coordination/Gait: Not assessed.    LABORATORY:      STUDIES & IMAGING:  Studies (EKG, EEG, EMG, etc):     Radiology (XR, CT, MR, U/S, TTE/JAMISON):

## 2021-10-04 NOTE — PROGRESS NOTE ADULT - ASSESSMENT
Assessment: 26 yo RH female with a PMHx of hashimoto's thyroiditis, autoimmune encephalitis, and recently diagnosed SLE. On exam, she has no neurologic deficits.     IMPRESSION: Previously diagnosed autoimmune encephalitis of undetermined specific serology, perhaps secondary to Hashimoto vs. JT. DDx perhaps including limbic encephalitis as well.     Plan:  [x] DC vEEG.  [] Neuro checks Q4HR.  [] Seizure precautions.  [x] s/p LP 10/1 with repeat AIE panel including markers for synaptic dysfunction - YKL 40, SNAP 25, Neuro Granin.  [x] JT Ab 0.18 (high)  [x] TPO thyroglobulin 54  [] f/u AIE panel serum  [x] Solumedrol 1G IV x 3 days   [] PT will need Actemra after course of steroids.  [] Continue Clonadine 0.2MG PO QHS, Melatonin 10MG PO QHS.  [] Epidiolex 100MG PO BID.  [] Continue Gabapentin 600MG PO QD.  [] Plan to repeat MRI brain w/wo w epilepsy protocol, and at some point PET scan.    [] DVT PPx: Lovenox 40MG SC QD.  [] Diet: Regular.   Assessment: 26 yo RH female with a PMHx of hashimoto's thyroiditis, autoimmune encephalitis, and recently diagnosed SLE. On exam, she has no neurologic deficits.     IMPRESSION: Previously diagnosed autoimmune encephalitis of undetermined specific serology, perhaps secondary to Hashimoto vs. JT. DDx perhaps including limbic encephalitis as well.     Plan:  [] ID consulted for new dx of syphillis (FTA positive, RPR positive)  [x] DC vEEG.  [] Neuro checks Q4HR.  [] Seizure precautions.  [x] s/p LP 10/1 with repeat AIE panel including markers for synaptic dysfunction - YKL 40, SNAP 25, Neuro Granin.  [x] JT Ab 0.18 (high)  [x] TPO thyroglobulin 54  [] f/u AIE panel serum  [x] Solumedrol 1G IV x 3 days   [] PT will need Actemra after course of steroids.  [] Continue Clonadine 0.2MG PO QHS, Melatonin 10MG PO QHS.  [] Epidiolex 100MG PO BID.  [] Continue Gabapentin 600MG PO QD.  [] Plan to repeat MRI brain w/wo w epilepsy protocol, and at some point PET scan.    [] DVT PPx: Lovenox 40MG SC QD.  [] Diet: Regular.   Assessment: 26 yo RH female with a PMHx of hashimoto's thyroiditis, autoimmune encephalitis, and recently diagnosed SLE. On exam, she has no neurologic deficits.     IMPRESSION: Previously diagnosed autoimmune encephalitis of undetermined specific serology, perhaps secondary to Hashimoto vs. JT. DDx perhaps including limbic encephalitis as well.     Plan:  [] ID consulted for new dx of syphillis (FTA positive, RPR positive)  [] plan for rituxan today  [x] s/p LP 10/1 with repeat AIE panel including markers for synaptic dysfunction - YKL 40, SNAP 25, Neuro Granin.  [x] JT Ab 0.18 (high)  [x] TPO thyroglobulin 54  [] f/u AIE panel serum  [] start steroid taper: prednisone 60mg daily (10/4-10/10), 40mg daily for 1 week, etc. s/p solumedrol 1G IV x 3 days   [] will consider Actemra    [] Continue Clonadine 0.2MG PO QHS, Melatonin 10MG PO QHS.  [] Epidiolex 100MG PO BID.  [] Continue Gabapentin 600MG PO QD.  [] Plan to repeat MRI brain w/wo w epilepsy protocol, and full body PET scan outpatient  [x] DC vEEG.  [] Neuro checks Q4HR.  [] Seizure precautions.  [] DVT PPx: Lovenox 40MG SC QD.  [] Diet: Regular.   Assessment: 24 yo RH female with a PMHx of hashimoto's thyroiditis, autoimmune encephalitis, and recently diagnosed SLE. On exam, she has no neurologic deficits.     IMPRESSION: Previously diagnosed autoimmune encephalitis of undetermined specific serology, perhaps secondary to Hashimoto vs. JT. DDx perhaps including limbic encephalitis as well.     Plan:  [] ID consulted for new dx of syphillis (FTA positive, RPR positive)  [x] s/p LP 10/1 with repeat AIE panel including markers for synaptic dysfunction - YKL 40, SNAP 25, Neuro Granin.  [x] JT Ab 0.18 (high)  [x] TPO thyroglobulin 54  [] f/u AIE panel serum  [] start steroid taper: prednisone 60mg daily (10/4-10/10), 40mg daily for 1 week, etc. s/p solumedrol 1G IV x 3 days   [] starting Actemra today  [] Continue Clonadine 0.2MG PO QHS, Melatonin 10MG PO QHS.  [] Epidiolex 100MG PO BID.  [] Continue Gabapentin 600MG PO QD.  [] Plan to repeat MRI brain w/wo w epilepsy protocol, and full body PET scan outpatient  [x] DC vEEG.  [] Neuro checks Q4HR.  [] Seizure precautions.  [] DVT PPx: Lovenox 40MG SC QD.  [] Diet: Regular.

## 2021-10-04 NOTE — CONSULT NOTE ADULT - ASSESSMENT
25y female with PMH of AIE, Lupus & Hashimoto's thyroiditis with hx of encepjhalopathy when her TPO antibodies were reportedly elevated  Started on high dose steroids.   However, her RPR & FTA turned out positive & ID called  She has never been sexually active & therefore denies all risk factor for momo syphilis  Her RPR titer of 1:1 is low but not negative & FTA being positive does not exclude syphilis   LP with only 2 WBCs, normal glucose & protein - so neurosyphilis is not suspected    PLAN:  With her serology being positive for syphilis; both screening & confirmatory testing - recommend that she be treated for latent syphilis with 3 intramuscular injections of Benzathine PCN 2.4 mU x 3 doses - 1 wk apart  This was reviewed with patient & her mom as well as with neurology PA  Thank you for the courtesy of this consult

## 2021-10-05 ENCOUNTER — TRANSCRIPTION ENCOUNTER (OUTPATIENT)
Age: 26
End: 2021-10-05

## 2021-10-05 VITALS
HEART RATE: 98 BPM | OXYGEN SATURATION: 96 % | RESPIRATION RATE: 18 BRPM | DIASTOLIC BLOOD PRESSURE: 96 MMHG | SYSTOLIC BLOOD PRESSURE: 163 MMHG | TEMPERATURE: 98 F

## 2021-10-05 LAB
ANION GAP SERPL CALC-SCNC: 20 MMOL/L — HIGH (ref 5–17)
BUN SERPL-MCNC: 13 MG/DL — SIGNIFICANT CHANGE UP (ref 7–23)
CALCIUM SERPL-MCNC: 8.8 MG/DL — SIGNIFICANT CHANGE UP (ref 8.4–10.5)
CHLORIDE SERPL-SCNC: 102 MMOL/L — SIGNIFICANT CHANGE UP (ref 96–108)
CO2 SERPL-SCNC: 15 MMOL/L — LOW (ref 22–31)
CREAT SERPL-MCNC: 0.49 MG/DL — LOW (ref 0.5–1.3)
GLUCOSE SERPL-MCNC: 176 MG/DL — HIGH (ref 70–99)
HCT VFR BLD CALC: 38.8 % — SIGNIFICANT CHANGE UP (ref 34.5–45)
HGB BLD-MCNC: 12.7 G/DL — SIGNIFICANT CHANGE UP (ref 11.5–15.5)
MCHC RBC-ENTMCNC: 31.7 PG — SIGNIFICANT CHANGE UP (ref 27–34)
MCHC RBC-ENTMCNC: 32.7 GM/DL — SIGNIFICANT CHANGE UP (ref 32–36)
MCV RBC AUTO: 96.8 FL — SIGNIFICANT CHANGE UP (ref 80–100)
NRBC # BLD: 0 /100 WBCS — SIGNIFICANT CHANGE UP (ref 0–0)
PLATELET # BLD AUTO: 277 K/UL — SIGNIFICANT CHANGE UP (ref 150–400)
POTASSIUM SERPL-MCNC: 4.3 MMOL/L — SIGNIFICANT CHANGE UP (ref 3.5–5.3)
POTASSIUM SERPL-SCNC: 4.3 MMOL/L — SIGNIFICANT CHANGE UP (ref 3.5–5.3)
RBC # BLD: 4.01 M/UL — SIGNIFICANT CHANGE UP (ref 3.8–5.2)
RBC # FLD: 14.8 % — HIGH (ref 10.3–14.5)
SODIUM SERPL-SCNC: 137 MMOL/L — SIGNIFICANT CHANGE UP (ref 135–145)
TM INTERPRETATION: SIGNIFICANT CHANGE UP
WBC # BLD: 16.99 K/UL — HIGH (ref 3.8–10.5)
WBC # FLD AUTO: 16.99 K/UL — HIGH (ref 3.8–10.5)

## 2021-10-05 PROCEDURE — 86769 SARS-COV-2 COVID-19 ANTIBODY: CPT

## 2021-10-05 PROCEDURE — 93005 ELECTROCARDIOGRAM TRACING: CPT

## 2021-10-05 PROCEDURE — 82164 ANGIOTENSIN I ENZYME TEST: CPT

## 2021-10-05 PROCEDURE — 84702 CHORIONIC GONADOTROPIN TEST: CPT

## 2021-10-05 PROCEDURE — 84100 ASSAY OF PHOSPHORUS: CPT

## 2021-10-05 PROCEDURE — 84157 ASSAY OF PROTEIN OTHER: CPT

## 2021-10-05 PROCEDURE — 86780 TREPONEMA PALLIDUM: CPT

## 2021-10-05 PROCEDURE — U0005: CPT

## 2021-10-05 PROCEDURE — U0003: CPT

## 2021-10-05 PROCEDURE — A9585: CPT

## 2021-10-05 PROCEDURE — 80074 ACUTE HEPATITIS PANEL: CPT

## 2021-10-05 PROCEDURE — 83615 LACTATE (LD) (LDH) ENZYME: CPT

## 2021-10-05 PROCEDURE — 84436 ASSAY OF TOTAL THYROXINE: CPT

## 2021-10-05 PROCEDURE — 99238 HOSP IP/OBS DSCHRG MGMT 30/<: CPT

## 2021-10-05 PROCEDURE — 84480 ASSAY TRIIODOTHYRONINE (T3): CPT

## 2021-10-05 PROCEDURE — 36415 COLL VENOUS BLD VENIPUNCTURE: CPT

## 2021-10-05 PROCEDURE — 85025 COMPLETE CBC W/AUTO DIFF WBC: CPT

## 2021-10-05 PROCEDURE — 85652 RBC SED RATE AUTOMATED: CPT

## 2021-10-05 PROCEDURE — 70553 MRI BRAIN STEM W/O & W/DYE: CPT

## 2021-10-05 PROCEDURE — 99285 EMERGENCY DEPT VISIT HI MDM: CPT | Mod: 25

## 2021-10-05 PROCEDURE — 80048 BASIC METABOLIC PNL TOTAL CA: CPT

## 2021-10-05 PROCEDURE — 86592 SYPHILIS TEST NON-TREP QUAL: CPT

## 2021-10-05 PROCEDURE — 86341 ISLET CELL ANTIBODY: CPT

## 2021-10-05 PROCEDURE — 88185 FLOWCYTOMETRY/TC ADD-ON: CPT

## 2021-10-05 PROCEDURE — 86255 FLUORESCENT ANTIBODY SCREEN: CPT

## 2021-10-05 PROCEDURE — 84166 PROTEIN E-PHORESIS/URINE/CSF: CPT

## 2021-10-05 PROCEDURE — 83916 OLIGOCLONAL BANDS: CPT

## 2021-10-05 PROCEDURE — 85027 COMPLETE CBC AUTOMATED: CPT

## 2021-10-05 PROCEDURE — 95700 EEG CONT REC W/VID EEG TECH: CPT

## 2021-10-05 PROCEDURE — 86376 MICROSOMAL ANTIBODY EACH: CPT

## 2021-10-05 PROCEDURE — 83519 RIA NONANTIBODY: CPT

## 2021-10-05 PROCEDURE — 80053 COMPREHEN METABOLIC PANEL: CPT

## 2021-10-05 PROCEDURE — 80307 DRUG TEST PRSMV CHEM ANLYZR: CPT

## 2021-10-05 PROCEDURE — 83735 ASSAY OF MAGNESIUM: CPT

## 2021-10-05 PROCEDURE — 83873 ASSAY OF CSF PROTEIN: CPT

## 2021-10-05 PROCEDURE — 86140 C-REACTIVE PROTEIN: CPT

## 2021-10-05 PROCEDURE — 84443 ASSAY THYROID STIM HORMONE: CPT

## 2021-10-05 PROCEDURE — 82945 GLUCOSE OTHER FLUID: CPT

## 2021-10-05 PROCEDURE — 89051 BODY FLUID CELL COUNT: CPT

## 2021-10-05 PROCEDURE — 86593 SYPHILIS TEST NON-TREP QUANT: CPT

## 2021-10-05 PROCEDURE — 88184 FLOWCYTOMETRY/ TC 1 MARKER: CPT

## 2021-10-05 PROCEDURE — 81001 URINALYSIS AUTO W/SCOPE: CPT

## 2021-10-05 PROCEDURE — 95715 VEEG EA 12-26HR INTMT MNTR: CPT

## 2021-10-05 PROCEDURE — 87205 SMEAR GRAM STAIN: CPT

## 2021-10-05 PROCEDURE — 86480 TB TEST CELL IMMUN MEASURE: CPT

## 2021-10-05 PROCEDURE — 95712 VEEG 2-12 HR INTMT MNTR: CPT

## 2021-10-05 RX ORDER — ATOMOXETINE HYDROCHLORIDE 10 MG/1
1 CAPSULE ORAL
Qty: 0 | Refills: 0 | DISCHARGE

## 2021-10-05 RX ORDER — HYDROXYCHLOROQUINE SULFATE 200 MG
200 TABLET ORAL
Qty: 0 | Refills: 0 | DISCHARGE

## 2021-10-05 RX ORDER — PENICILLIN G BENZATHINE 1200000 [IU]/2ML
2.4 INJECTION, SUSPENSION INTRAMUSCULAR
Qty: 2 | Refills: 0
Start: 2021-10-05

## 2021-10-05 RX ORDER — FOLIC ACID 0.8 MG
1 TABLET ORAL
Qty: 30 | Refills: 0
Start: 2021-10-05

## 2021-10-05 RX ORDER — PANTOPRAZOLE SODIUM 20 MG/1
1 TABLET, DELAYED RELEASE ORAL
Qty: 30 | Refills: 0
Start: 2021-10-05 | End: 2021-11-03

## 2021-10-05 RX ORDER — ALENDRONATE SODIUM 70 MG/1
1 TABLET ORAL
Qty: 0 | Refills: 0 | DISCHARGE

## 2021-10-05 RX ORDER — GABAPENTIN 400 MG/1
1 CAPSULE ORAL
Qty: 0 | Refills: 0 | DISCHARGE

## 2021-10-05 RX ORDER — PENICILLIN V POTASSIUM 250 MG
2.4 TABLET ORAL
Qty: 8 | Refills: 0
Start: 2021-10-05

## 2021-10-05 RX ORDER — METHOTREXATE 2.5 MG/1
1 TABLET ORAL
Qty: 0 | Refills: 0 | DISCHARGE

## 2021-10-05 RX ORDER — IMMUNE GLOBULIN,GAMMA(IGG) 5 %
70 VIAL (ML) INTRAVENOUS
Qty: 0 | Refills: 0 | DISCHARGE

## 2021-10-05 RX ORDER — FOLIC ACID 0.8 MG
1 TABLET ORAL
Qty: 0 | Refills: 0 | DISCHARGE
Start: 2021-10-05

## 2021-10-05 RX ORDER — ALENDRONATE SODIUM 70 MG/1
1 TABLET ORAL
Qty: 5 | Refills: 0
Start: 2021-10-05

## 2021-10-05 RX ORDER — LANOLIN ALCOHOL/MO/W.PET/CERES
1 CREAM (GRAM) TOPICAL
Qty: 0 | Refills: 0 | DISCHARGE

## 2021-10-05 RX ORDER — LANOLIN ALCOHOL/MO/W.PET/CERES
1 CREAM (GRAM) TOPICAL
Qty: 30 | Refills: 0
Start: 2021-10-05

## 2021-10-05 RX ORDER — ATOMOXETINE HYDROCHLORIDE 10 MG/1
1 CAPSULE ORAL
Qty: 30 | Refills: 0
Start: 2021-10-05

## 2021-10-05 RX ORDER — GABAPENTIN 400 MG/1
1 CAPSULE ORAL
Qty: 30 | Refills: 0
Start: 2021-10-05

## 2021-10-05 RX ORDER — FOLIC ACID 0.8 MG
1 TABLET ORAL
Qty: 0 | Refills: 0 | DISCHARGE

## 2021-10-05 RX ADMIN — CANNABIDIOL 100 MILLIGRAM(S): 100 SOLUTION ORAL at 17:13

## 2021-10-05 RX ADMIN — TOCILIZUMAB 100 MILLIGRAM(S): 20 INJECTION, SOLUTION, CONCENTRATE INTRAVENOUS at 17:12

## 2021-10-05 RX ADMIN — Medication 1 MILLIGRAM(S): at 11:45

## 2021-10-05 RX ADMIN — CANNABIDIOL 100 MILLIGRAM(S): 100 SOLUTION ORAL at 08:14

## 2021-10-05 RX ADMIN — Medication 58 MILLIGRAM(S): at 05:31

## 2021-10-05 RX ADMIN — PANTOPRAZOLE SODIUM 40 MILLIGRAM(S): 20 TABLET, DELAYED RELEASE ORAL at 05:31

## 2021-10-05 NOTE — PROGRESS NOTE ADULT - SUBJECTIVE AND OBJECTIVE BOX
SUBJECTIVE: Pt seen and examined at bedside. No acute events overnight.    INTERVAL HISTORY:    PAST MEDICAL & SURGICAL HISTORY:  Hashimoto&#x27;s thyroiditis    Autoimmune encephalitis    Systemic lupus erythematosus    No significant past surgical history      FAMILY HISTORY:  FH: HTN (hypertension) (Father)      SOCIAL HISTORY:   T/E/D:   Occupation:   Lives with:     MEDICATIONS (HOME):  Home Medications:  alendronate 70 mg oral tablet: 1 tab(s) orally once a week (01 Oct 2021 22:11)  cannabidiol 100 mg/mL oral liquid: 0.5 milliliter(s) orally 2 times a day (01 Oct 2021 22:11)  cloNIDine 0.2 mg oral tablet: 1 tab(s) orally once a day (at bedtime) (01 Oct 2021 22:11)  folic acid 1 mg oral tablet: 1 tab(s) orally once a day except MTX days (01 Oct 2021 22:11)  gabapentin 600 mg oral tablet: 1 tab(s) orally once a day (01 Oct 2021 22:11)  IVI gram(s) intravenous every 2 weeks (01 Oct 2021 22:11)  Melatonin 10 mg oral capsule: 1 cap(s) orally once a day (at bedtime) (01 Oct 2021 22:11)  methotrexate 15 mg oral tablet: 1 tab(s) orally once a week (01 Oct 2021 22:11)  Plaquenil: 200 milligram(s) orally 2 times a day (01 Oct 2021 22:11)  predniSONE: 24 milligram(s) orally once a day (01 Oct 2021 22:11)  Strattera 60 mg oral capsule: 1 cap(s) orally once a day (in the morning) (01 Oct 2021 22:11)  Tri-Sprintec oral tablet: 1 tab(s) orally once a day (01 Oct 2021 22:11)    MEDICATIONS  (STANDING):  cannabidiol Oral Solution 100 milliGRAM(s) Oral two times a day with meals  cloNIDine 0.2 milliGRAM(s) Oral at bedtime  enoxaparin Injectable 40 milliGRAM(s) SubCutaneous at bedtime  folic acid 1 milliGRAM(s) Oral daily  gabapentin 600 milliGRAM(s) Oral daily  lidocaine 1% (Preservative-free) Injectable 10 milliLiter(s) Local Injection once  melatonin 10 milliGRAM(s) Oral at bedtime  pantoprazole    Tablet 40 milliGRAM(s) Oral before breakfast  penicillin   G benzathine Injectable 2.4 Million Unit(s) IntraMuscular every 7 days  tocilizumab IVPB 800 milliGRAM(s) IV Intermittent once    MEDICATIONS  (PRN):  LORazepam   Injectable 2 milliGRAM(s) IV Push once PRN For convulsion lasting >3 min    ALLERGIES/INTOLERANCES:  Allergies  Benadryl (Anaphylaxis)    Intolerances    VITALS & EXAMINATION:  Vital Signs Last 24 Hrs  T(C): 36.6 (05 Oct 2021 04:16), Max: 36.9 (04 Oct 2021 15:32)  T(F): 97.8 (05 Oct 2021 04:16), Max: 98.4 (04 Oct 2021 15:32)  HR: 105 (05 Oct 2021 04:16) (100 - 105)  BP: 143/95 (05 Oct 2021 04:16) (119/75 - 144/92)  BP(mean): --  RR: 18 (05 Oct 2021 04:16) (18 - 19)  SpO2: 97% (05 Oct 2021 04:16) (97% - 99%)    General:  Constitutional: Lying in bed, NAD.  Head: Normocephalic, atraumatic.  Extremities: No edema.    NEUROLOGICAL EXAM:  MS: Alert, eyes open spontaneously. Oriented to self, location, year. Speech is fluent, not slurred. Follows commands.  CN: PERRL 5mm b/l. EOMI. Face symmetric.  Motor: All extremities antigravity, no pronator drift  Sensory: Intact to LT throughout.  Coordination/Gait: Not assessed.    LABORATORY:  CBC   Chem       LFTs   Coagulopathy   Lipid Panel   A1c   Cardiac enzymes     U/A   CSF  Immunological  Other    STUDIES & IMAGING:  Studies (EKG, EEG, EMG, etc):     Radiology (XR, CT, MR, U/S, TTE/JAMISON): SUBJECTIVE: Pt seen and examined at bedside. No acute events overnight. Pt reports some diarrhea and arthralgias. No issues with urination, PO intake. Has been ambulating    INTERVAL HISTORY:    PAST MEDICAL & SURGICAL HISTORY:  Hashimoto&#x27;s thyroiditis    Autoimmune encephalitis    Systemic lupus erythematosus    No significant past surgical history      FAMILY HISTORY:  FH: HTN (hypertension) (Father)      SOCIAL HISTORY:   T/E/D:   Occupation:   Lives with:     MEDICATIONS (HOME):  Home Medications:  alendronate 70 mg oral tablet: 1 tab(s) orally once a week (01 Oct 2021 22:11)  cannabidiol 100 mg/mL oral liquid: 0.5 milliliter(s) orally 2 times a day (01 Oct 2021 22:11)  cloNIDine 0.2 mg oral tablet: 1 tab(s) orally once a day (at bedtime) (01 Oct 2021 22:11)  folic acid 1 mg oral tablet: 1 tab(s) orally once a day except MTX days (01 Oct 2021 22:11)  gabapentin 600 mg oral tablet: 1 tab(s) orally once a day (01 Oct 2021 22:11)  IVI gram(s) intravenous every 2 weeks (01 Oct 2021 22:11)  Melatonin 10 mg oral capsule: 1 cap(s) orally once a day (at bedtime) (01 Oct 2021 22:11)  methotrexate 15 mg oral tablet: 1 tab(s) orally once a week (01 Oct 2021 22:11)  Plaquenil: 200 milligram(s) orally 2 times a day (01 Oct 2021 22:11)  predniSONE: 24 milligram(s) orally once a day (01 Oct 2021 22:11)  Strattera 60 mg oral capsule: 1 cap(s) orally once a day (in the morning) (01 Oct 2021 22:11)  Tri-Sprintec oral tablet: 1 tab(s) orally once a day (01 Oct 2021 22:11)    MEDICATIONS  (STANDING):  cannabidiol Oral Solution 100 milliGRAM(s) Oral two times a day with meals  cloNIDine 0.2 milliGRAM(s) Oral at bedtime  enoxaparin Injectable 40 milliGRAM(s) SubCutaneous at bedtime  folic acid 1 milliGRAM(s) Oral daily  gabapentin 600 milliGRAM(s) Oral daily  lidocaine 1% (Preservative-free) Injectable 10 milliLiter(s) Local Injection once  melatonin 10 milliGRAM(s) Oral at bedtime  pantoprazole    Tablet 40 milliGRAM(s) Oral before breakfast  penicillin   G benzathine Injectable 2.4 Million Unit(s) IntraMuscular every 7 days  tocilizumab IVPB 800 milliGRAM(s) IV Intermittent once    MEDICATIONS  (PRN):  LORazepam   Injectable 2 milliGRAM(s) IV Push once PRN For convulsion lasting >3 min    ALLERGIES/INTOLERANCES:  Allergies  Benadryl (Anaphylaxis)    Intolerances    VITALS & EXAMINATION:  Vital Signs Last 24 Hrs  T(C): 36.6 (05 Oct 2021 04:16), Max: 36.9 (04 Oct 2021 15:32)  T(F): 97.8 (05 Oct 2021 04:16), Max: 98.4 (04 Oct 2021 15:32)  HR: 105 (05 Oct 2021 04:16) (100 - 105)  BP: 143/95 (05 Oct 2021 04:16) (119/75 - 144/92)  BP(mean): --  RR: 18 (05 Oct 2021 04:16) (18 - 19)  SpO2: 97% (05 Oct 2021 04:16) (97% - 99%)    General:  Constitutional: Lying in bed, NAD.  Head: Normocephalic, atraumatic.  Extremities: No edema.    NEUROLOGICAL EXAM:  MS: Alert, eyes open spontaneously. Oriented to self, location, year. Speech is fluent, not slurred. Follows commands.  CN: PERRL 5mm b/l. EOMI. Face symmetric.  Motor: All extremities antigravity, no pronator drift  Sensory: Intact to LT throughout.  Coordination/Gait: Not assessed.    LABORATORY:  CBC   Chem       LFTs   Coagulopathy   Lipid Panel   A1c   Cardiac enzymes     U/A   CSF  Immunological  Other    STUDIES & IMAGING:  Studies (EKG, EEG, EMG, etc):     Radiology (XR, CT, MR, U/S, TTE/JAMISON):

## 2021-10-05 NOTE — DISCHARGE NOTE NURSING/CASE MANAGEMENT/SOCIAL WORK - PATIENT PORTAL LINK FT
You can access the FollowMyHealth Patient Portal offered by [facility] by registering at the following website: [email or web address]. By joining [facility]’s FollowMyHealth portal, you will also be able to view your health information using other applications (apps) compatible with our system.

## 2021-10-05 NOTE — PROGRESS NOTE ADULT - REASON FOR ADMISSION
Autoimmune encephalitis re-evaluation.

## 2021-10-05 NOTE — PROGRESS NOTE ADULT - ASSESSMENT
Assessment: 24 yo RH female with a PMHx of hashimoto's thyroiditis, autoimmune encephalitis, and recently diagnosed SLE. On exam, she has no neurologic deficits.     IMPRESSION: Previously diagnosed autoimmune encephalitis of undetermined specific serology, perhaps secondary to Hashimoto vs. JT. DDx perhaps including limbic encephalitis as well.     Plan:  [x] ID consulted for new dx of syphillis (FTA positive, RPR positive) -  treatment started for latent syphilis with 3 intramuscular injections of Benzathine PCN 2.4 mU x 3 doses - 1 wk apart (10/4)  [x] s/p LP 10/1 with repeat AIE panel including markers for synaptic dysfunction - YKL 40, SNAP 25, Neuro Granin.  [x] JT Ab 0.18 (high)  [x] TPO thyroglobulin 54  [] f/u AIE panel serum  [] start steroid taper: prednisone 60mg daily (10/4-10/10), 40mg daily for 1 week, etc. s/p solumedrol 1G IV x 3 days   [] starting Actemra today  [] Continue Clonadine 0.2MG PO QHS, Melatonin 10MG PO QHS.  [] Epidiolex 100MG PO BID.  [] Continue Gabapentin 600MG PO QD.  [] Plan to repeat MRI brain w/wo w epilepsy protocol, and full body PET scan outpatient  [x] DC vEEG.  [] Neuro checks Q4HR.  [] Seizure precautions.  [] DVT PPx: Lovenox 40MG SC QD.  [] Diet: Regular.   Assessment: 24 yo RH female with a PMHx of hashimoto's thyroiditis, autoimmune encephalitis, and recently diagnosed SLE. On exam, she has no neurologic deficits.     IMPRESSION: Previously diagnosed autoimmune encephalitis of undetermined specific serology, perhaps secondary to Hashimoto vs. JT. DDx perhaps including limbic encephalitis as well.     Plan:  [x] ID consulted for new dx of syphillis (FTA positive, RPR positive) -  treatment started for latent syphilis with 3 intramuscular injections of Benzathine PCN 2.4 mU x 3 doses - 1 wk apart (started 10/4)  [x] s/p LP 10/1 with repeat AIE panel  [x] JT Ab 0.18 (high)  [x] TPO thyroglobulin 54  [] f/u AIE panel serum  [] start steroid taper: prednisone 60mg daily (10/4-10/10), 40mg daily for 1 week, etc. s/p solumedrol 1G IV x 3 days   [] starting Actemra today  [] Continue Clonadine 0.2MG PO QHS, Melatonin 10MG PO QHS.  [] Epidiolex 100MG PO BID.  [] Continue Gabapentin 600MG PO QD.  [] Plan to repeat MRI brain w/wo w epilepsy protocol, and full body PET scan outpatient  [] Neuro checks Q4HR.  [] Seizure precautions.  [] DVT PPx: Lovenox 40MG SC QD.  [] Diet: Regular.

## 2021-10-07 ENCOUNTER — TRANSCRIPTION ENCOUNTER (OUTPATIENT)
Age: 26
End: 2021-10-07

## 2021-10-07 LAB
GAD65 AB SER-MCNC: 0.23 NMOL/L — HIGH
IGG SYNTH RATE SER+CSF CALC-MRATE: -9.1 MG/DAY — SIGNIFICANT CHANGE UP
VDRL CSF-TITR: SIGNIFICANT CHANGE UP

## 2021-10-13 PROBLEM — M32.9 SYSTEMIC LUPUS ERYTHEMATOSUS, UNSPECIFIED: Chronic | Status: ACTIVE | Noted: 2021-09-30

## 2021-10-13 PROBLEM — E06.3 AUTOIMMUNE THYROIDITIS: Chronic | Status: ACTIVE | Noted: 2021-09-30

## 2021-10-13 PROBLEM — G04.81 OTHER ENCEPHALITIS AND ENCEPHALOMYELITIS: Chronic | Status: ACTIVE | Noted: 2021-09-30

## 2021-10-15 ENCOUNTER — TRANSCRIPTION ENCOUNTER (OUTPATIENT)
Age: 26
End: 2021-10-15

## 2021-10-18 LAB
OLIGOCLONAL BANDS CSF ELPH-IMP: SIGNIFICANT CHANGE UP
OLIGOCLONAL BANDS CSF ELPH-IMP: SIGNIFICANT CHANGE UP

## 2021-10-19 ENCOUNTER — APPOINTMENT (OUTPATIENT)
Dept: NEUROLOGY | Facility: CLINIC | Age: 26
End: 2021-10-19
Payer: COMMERCIAL

## 2021-10-19 PROCEDURE — 99214 OFFICE O/P EST MOD 30 MIN: CPT | Mod: 95

## 2021-10-20 NOTE — HISTORY OF PRESENT ILLNESS
[FreeTextEntry1] : *** 10/19/2021 ***\par \par Appointment was conducted by video conference in place of cancelled appointment due to heighten concern over coronavirus infection.\par Verbal consent was given on *** 10/19/2021 *** by the patient, who understand that tele-visits will be charged to insurance and may involve co-pay for patient\par Physician location home\par Patient location home\par Patient on call: Dr. Tineo ,patient\par \par MELISSA BRITO is seen for follow up. she tolerated well EMU admission and 5 days course of solumedrol.\par her CSF returned positive for JT antibody.\par \par she will resume her IVIG and Rituxan\par \par \par \par \par Patient is a 25 year old female past medical history significant for AIE presenting for further evaluation and management of AIE. Patient reports she began suffering from symptoms at age 18 years. symptoms initially presented as insomnia. Patient began to experience seizure events.  Patient began to demonstrate poor functioning in school, reporting poor concentration and poor working memory skills Patient reports having to partially cover large blocks of text in order to be able to comprehend the information contained therein. Patient was diagnosed with Hashimoto's encephalopathy. TPO antibodies were seen to be elevated. CSF analysis basic studies were normal. CSF AIE panel demonstrated rising TPO, minor elevation in JT no other abnormalities.  PET scan done in June 2020, demonstrated cerebellar hypometabolism.  Patient was treated with steroids, IVIG. and rituximab. symptoms began to improve. Steroids were oral daily, IVIG was monthly, and Rituximab dosed intermittently. Patient reports completing college. From [id number] patient was treated with oral prednisone and intermittent dosing on rituximab based on CD 20 counts. Patient was latter followed by another doctor, who favored a diagnosis of functional neurological disorder. Patient was treated with low dose of methotrexate, but tolerated treatment poorly. \par \par Patient is currently on prednisone 24 daily. Patient is dependant on this dose, with symptoms reemerging when any further downtitration. Currently, patient reports sensitivity to light and loud sounds. \par

## 2021-10-20 NOTE — ASSESSMENT
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

## 2021-10-25 ENCOUNTER — TRANSCRIPTION ENCOUNTER (OUTPATIENT)
Age: 26
End: 2021-10-25

## 2021-10-26 ENCOUNTER — TRANSCRIPTION ENCOUNTER (OUTPATIENT)
Age: 26
End: 2021-10-26

## 2021-10-28 ENCOUNTER — APPOINTMENT (OUTPATIENT)
Dept: NEUROLOGY | Facility: CLINIC | Age: 26
End: 2021-10-28

## 2021-11-01 ENCOUNTER — TRANSCRIPTION ENCOUNTER (OUTPATIENT)
Age: 26
End: 2021-11-01

## 2021-11-01 RX ORDER — PANTOPRAZOLE 40 MG/1
40 TABLET, DELAYED RELEASE ORAL
Qty: 30 | Refills: 0 | Status: ACTIVE | COMMUNITY
Start: 2021-11-01 | End: 1900-01-01

## 2021-11-05 ENCOUNTER — TRANSCRIPTION ENCOUNTER (OUTPATIENT)
Age: 26
End: 2021-11-05

## 2021-11-09 LAB — IMMUNOLOGIST REVIEW: NEGATIVE — SIGNIFICANT CHANGE UP

## 2021-11-12 ENCOUNTER — APPOINTMENT (OUTPATIENT)
Dept: NEUROLOGY | Facility: CLINIC | Age: 26
End: 2021-11-12
Payer: COMMERCIAL

## 2021-11-12 PROCEDURE — 99214 OFFICE O/P EST MOD 30 MIN: CPT | Mod: 95

## 2021-11-21 ENCOUNTER — RX RENEWAL (OUTPATIENT)
Age: 26
End: 2021-11-21

## 2021-11-27 ENCOUNTER — TRANSCRIPTION ENCOUNTER (OUTPATIENT)
Age: 26
End: 2021-11-27

## 2021-11-27 NOTE — DISCUSSION/SUMMARY
[FreeTextEntry1] : Patient presents for evaluation of AIE. patient. Patient with previous CSF analysis demonstrating elevation in TPO antibody and minor possibly beneath threshold evaluation for JT. Patient's wish is to be titrated off steroids as the negative side effects, are burdensome. Patient \par \par \par plan: IVIG 2 grams /kg/ month divided q 2 weeks for better tolerability\par Rituxan 1000 /Actemra 800 according to the schedule\par prednisone 20 mg daily\par \par neuropsych in progress\par will need PET scan brain\par

## 2021-11-27 NOTE — HISTORY OF PRESENT ILLNESS
[FreeTextEntry1] : *** 11/12/2021 ***\par \par MELISSA BRITO is seen for follow up. mild but steady improvement \par Appointment was conducted by video conference in place of cancelled appointment due to heighten concern over coronavirus infection.\par Verbal consent was given on *** 11/12/2021 ***  by the patient, who understand that tele-visits will be charged to insurance and may involve co-pay for patient\par Physician location home\par Patient location home\par Patient on call: Dr. Tineo , mother, spouse, father, patient\par  \par \par \par *** 10/19/2021 ***\par \par Appointment was conducted by video conference in place of cancelled appointment due to heighten concern over coronavirus infection.\par Verbal consent was given on *** 10/19/2021 *** by the patient, who understand that tele-visits will be charged to insurance and may involve co-pay for patient\par Physician location home\par Patient location home\par Patient on call: Dr. Tineo ,patient\par \par MELISSA BRITO is seen for follow up. she tolerated well EMU admission and 5 days course of solumedrol.\par her CSF returned positive for JT antibody.\par \par she will resume her IVIG and Rituxan\par \par \par \par \par Patient is a 25 year old female past medical history significant for AIE presenting for further evaluation and management of AIE. Patient reports she began suffering from symptoms at age 18 years. symptoms initially presented as insomnia. Patient began to experience seizure events.  Patient began to demonstrate poor functioning in school, reporting poor concentration and poor working memory skills Patient reports having to partially cover large blocks of text in order to be able to comprehend the information contained therein. Patient was diagnosed with Hashimoto's encephalopathy. TPO antibodies were seen to be elevated. CSF analysis basic studies were normal. CSF AIE panel demonstrated rising TPO, minor elevation in JT no other abnormalities.  PET scan done in June 2020, demonstrated cerebellar hypometabolism.  Patient was treated with steroids, IVIG. and rituximab. symptoms began to improve. Steroids were oral daily, IVIG was monthly, and Rituximab dosed intermittently. Patient reports completing college. From [id number] patient was treated with oral prednisone and intermittent dosing on rituximab based on CD 20 counts. Patient was latter followed by another doctor, who favored a diagnosis of functional neurological disorder. Patient was treated with low dose of methotrexate, but tolerated treatment poorly. \par \par Patient is currently on prednisone 24 daily. Patient is dependant on this dose, with symptoms reemerging when any further downtitration. Currently, patient reports sensitivity to light and loud sounds. \par

## 2021-12-08 ENCOUNTER — APPOINTMENT (OUTPATIENT)
Dept: NEUROLOGY | Facility: CLINIC | Age: 26
End: 2021-12-08
Payer: COMMERCIAL

## 2021-12-08 PROCEDURE — 99214 OFFICE O/P EST MOD 30 MIN: CPT | Mod: 95

## 2021-12-19 NOTE — DISCUSSION/SUMMARY
[FreeTextEntry1] : Patient presents for evaluation of AIE. patient. Patient with previous CSF analysis demonstrating elevation in TPO antibody and minor possibly beneath threshold evaluation for JT. Patient's wish is to be titrated off steroids as the negative side effects, are burdensome. Patient \par \par \par plan: IVIG 2 grams /kg/ month divided q 2 weeks for better tolerability\par Rituxan 1000 /Actemra 800 according to the schedule\par prednisone 15 mg daily\par \par neuropsych in progress\par will need PET scan brain\par \par may need Myfortic ( Cellcept XR) if she does not tolerate tapering off prednisone\par

## 2021-12-19 NOTE — HISTORY OF PRESENT ILLNESS
[FreeTextEntry1] : \par \par *** 12/08/2021 ***\par \par MELISSA BRITO is seen for follow up. mild but steady improvement \par Appointment was conducted by video conference in place of cancelled appointment due to heighten concern over coronavirus infection.\par Verbal consent was given on *** 12/08/2021 ***  by the patient, who understand that tele-visits will be charged to insurance and may involve co-pay for patient\par Physician location home\par Patient location home\par Patient on call: Dr. Tineo ,patient\par  \par \par MELISSA BRITO is seen for follow up. she has mild improvement but not even close to baseline. still on steroids ( 20). received actemra, awaiting IVIG and rituxan\par \par *** 11/12/2021 ***\par \par MELISSA BRITO is seen for follow up. mild but steady improvement \par Appointment was conducted by video conference in place of cancelled appointment due to heighten concern over coronavirus infection.\par Verbal consent was given on *** 11/12/2021 ***  by the patient, who understand that tele-visits will be charged to insurance and may involve co-pay for patient\par Physician location home\par Patient location home\par Patient on call: Dr. Tieno , patient\par  \par \par \par *** 10/19/2021 ***\par \par Appointment was conducted by video conference in place of cancelled appointment due to heighten concern over coronavirus infection.\par Verbal consent was given on *** 10/19/2021 *** by the patient, who understand that tele-visits will be charged to insurance and may involve co-pay for patient\par Physician location home\par Patient location home\par Patient on call: Dr. Tineo ,patient\par \par MELISSA BRITO is seen for follow up. she tolerated well EMU admission and 5 days course of solumedrol.\par her CSF returned positive for JT antibody.\par \par she will resume her IVIG and Rituxan\par \par \par \par \par Patient is a 25 year old female past medical history significant for AIE presenting for further evaluation and management of AIE. Patient reports she began suffering from symptoms at age 18 years. symptoms initially presented as insomnia. Patient began to experience seizure events.  Patient began to demonstrate poor functioning in school, reporting poor concentration and poor working memory skills Patient reports having to partially cover large blocks of text in order to be able to comprehend the information contained therein. Patient was diagnosed with Hashimoto's encephalopathy. TPO antibodies were seen to be elevated. CSF analysis basic studies were normal. CSF AIE panel demonstrated rising TPO, minor elevation in JT no other abnormalities.  PET scan done in June 2020, demonstrated cerebellar hypometabolism.  Patient was treated with steroids, IVIG. and rituximab. symptoms began to improve. Steroids were oral daily, IVIG was monthly, and Rituximab dosed intermittently. Patient reports completing college. From [id number] patient was treated with oral prednisone and intermittent dosing on rituximab based on CD 20 counts. Patient was latter followed by another doctor, who favored a diagnosis of functional neurological disorder. Patient was treated with low dose of methotrexate, but tolerated treatment poorly. \par \par Patient is currently on prednisone 24 daily. Patient is dependant on this dose, with symptoms reemerging when any further downtitration. Currently, patient reports sensitivity to light and loud sounds. \par

## 2022-01-03 ENCOUNTER — TRANSCRIPTION ENCOUNTER (OUTPATIENT)
Age: 27
End: 2022-01-03

## 2022-01-04 ENCOUNTER — TRANSCRIPTION ENCOUNTER (OUTPATIENT)
Age: 27
End: 2022-01-04

## 2022-01-06 ENCOUNTER — TRANSCRIPTION ENCOUNTER (OUTPATIENT)
Age: 27
End: 2022-01-06

## 2022-02-08 ENCOUNTER — APPOINTMENT (OUTPATIENT)
Dept: NEUROLOGY | Facility: CLINIC | Age: 27
End: 2022-02-08
Payer: COMMERCIAL

## 2022-02-08 PROCEDURE — 99214 OFFICE O/P EST MOD 30 MIN: CPT | Mod: 95

## 2022-02-23 ENCOUNTER — RX RENEWAL (OUTPATIENT)
Age: 27
End: 2022-02-23

## 2022-02-23 NOTE — HISTORY OF PRESENT ILLNESS
[FreeTextEntry1] : *** 02/08/2022 ***\par \par Appointment was conducted by video conference in place of cancelled appointment due to heighten concern over coronavirus infection.\par Verbal consent was given on *** 02/08/2022 ***  by the patient, who understand that tele-visits will be charged to insurance and may involve co-pay for patient\par Physician location home\par Patient location home\par Patient on call: Dr. Tineo , patient\par \par \par MELISSA BRITO is seen for follow up. stable, tolerating well steroid taper\par \par \par \par *** 12/08/2021 ***\par \par MELISSA BRITO is seen for follow up. mild but steady improvement \par Appointment was conducted by video conference in place of cancelled appointment due to heighten concern over coronavirus infection.\par Verbal consent was given on *** 12/08/2021 ***  by the patient, who understand that tele-visits will be charged to insurance and may involve co-pay for patient\par Physician location home\par Patient location home\par Patient on call: Dr. Tineo ,patient\par  \par \par MELISSA BRITO is seen for follow up. she has mild improvement but not even close to baseline. still on steroids ( 20). received actemra, awaiting IVIG and rituxan\par \par *** 11/12/2021 ***\par \par MELISSA BRITO is seen for follow up. mild but steady improvement \par Appointment was conducted by video conference in place of cancelled appointment due to heighten concern over coronavirus infection.\par Verbal consent was given on *** 11/12/2021 ***  by the patient, who understand that tele-visits will be charged to insurance and may involve co-pay for patient\par Physician location home\par Patient location home\par Patient on call: Dr. Tineo , patient\par  \par \par \par *** 10/19/2021 ***\par \par Appointment was conducted by video conference in place of cancelled appointment due to heighten concern over coronavirus infection.\par Verbal consent was given on *** 10/19/2021 *** by the patient, who understand that tele-visits will be charged to insurance and may involve co-pay for patient\par Physician location home\par Patient location home\par Patient on call: Dr. Tineo ,patient\par \par MELISSA BRITO is seen for follow up. she tolerated well EMU admission and 5 days course of solumedrol.\par her CSF returned positive for JT antibody.\par \par she will resume her IVIG and Rituxan\par \par \par \par \par Patient is a 25 year old female past medical history significant for AIE presenting for further evaluation and management of AIE. Patient reports she began suffering from symptoms at age 18 years. symptoms initially presented as insomnia. Patient began to experience seizure events.  Patient began to demonstrate poor functioning in school, reporting poor concentration and poor working memory skills Patient reports having to partially cover large blocks of text in order to be able to comprehend the information contained therein. Patient was diagnosed with Hashimoto's encephalopathy. TPO antibodies were seen to be elevated. CSF analysis basic studies were normal. CSF AIE panel demonstrated rising TPO, minor elevation in JT no other abnormalities.  PET scan done in June 2020, demonstrated cerebellar hypometabolism.  Patient was treated with steroids, IVIG. and rituximab. symptoms began to improve. Steroids were oral daily, IVIG was monthly, and Rituximab dosed intermittently. Patient reports completing college. From [id number] patient was treated with oral prednisone and intermittent dosing on rituximab based on CD 20 counts. Patient was latter followed by another doctor, who favored a diagnosis of functional neurological disorder. Patient was treated with low dose of methotrexate, but tolerated treatment poorly. \par \par Patient is currently on prednisone 24 daily. Patient is dependant on this dose, with symptoms reemerging when any further downtitration. Currently, patient reports sensitivity to light and loud sounds. \par

## 2022-04-05 NOTE — ED ADULT TRIAGE NOTE - MEANS OF ARRIVAL
ambulatory pATIENT ADMITTED TO TELEMETRY ENDORSED TO HOLD rN . pATIENT TO BE TRANSPORTED TO HOLDING VIA [facility] STABLE INNO ACUTE DISTRESS SAFETY MAINTAINED.

## 2022-04-14 ENCOUNTER — TRANSCRIPTION ENCOUNTER (OUTPATIENT)
Age: 27
End: 2022-04-14

## 2022-05-02 ENCOUNTER — APPOINTMENT (OUTPATIENT)
Dept: NEUROLOGY | Facility: CLINIC | Age: 27
End: 2022-05-02
Payer: COMMERCIAL

## 2022-05-02 PROCEDURE — 99214 OFFICE O/P EST MOD 30 MIN: CPT | Mod: 95

## 2022-05-02 RX ORDER — METHYLPHENIDATE HYDROCHLORIDE 5 MG/1
5 TABLET ORAL TWICE DAILY
Qty: 60 | Refills: 0 | Status: ACTIVE | COMMUNITY
Start: 2022-05-02 | End: 1900-01-01

## 2022-05-02 NOTE — HISTORY OF PRESENT ILLNESS
[FreeTextEntry1] : *** 05/02/2022 ***\par \par Appointment was conducted by video conference in place of cancelled appointment due to heighten concern over coronavirus infection.\par Verbal consent was given on *** 05/02/2022 *** by the patient, who understand that tele-visits will be charged to insurance and may involve co-pay for patient\par Physician location home\par Patient location home\par Patient on call: Dr. Tineo , patient\par \par \par MELISSA BRITO is seen for follow up. she continue to have difficulties sleeping, focusing, remembering thinks.\par she received Rituxan x2 in December 2021, Actemra 800 x1, and IVIG q 2 weeks.\par \par \par *** 02/08/2022 ***\par \par Appointment was conducted by video conference in place of cancelled appointment due to heighten concern over coronavirus infection.\par Verbal consent was given on *** 02/08/2022 ***  by the patient, who understand that tele-visits will be charged to insurance and may involve co-pay for patient\par Physician location home\par Patient location home\par Patient on call: Dr. Tineo , patient\par \par \par MELISSA BRITO is seen for follow up. stable, tolerating well steroid taper\par \par \par \par *** 12/08/2021 ***\par \par MELISSA BRITO is seen for follow up. mild but steady improvement \par Appointment was conducted by video conference in place of cancelled appointment due to heighten concern over coronavirus infection.\par Verbal consent was given on *** 12/08/2021 ***  by the patient, who understand that tele-visits will be charged to insurance and may involve co-pay for patient\par Physician location home\par Patient location home\par Patient on call: Dr. Tineo ,patient\par  \par \par MELISSA BRITO is seen for follow up. she has mild improvement but not even close to baseline. still on steroids ( 20). received actemra, awaiting IVIG and rituxan\par \par *** 11/12/2021 ***\par \par MELISSA BRITO is seen for follow up. mild but steady improvement \par Appointment was conducted by video conference in place of cancelled appointment due to heighten concern over coronavirus infection.\par Verbal consent was given on *** 11/12/2021 ***  by the patient, who understand that tele-visits will be charged to insurance and may involve co-pay for patient\par Physician location home\par Patient location home\par Patient on call: Dr. Tineo , patient\par  \par \par \par *** 10/19/2021 ***\par \par Appointment was conducted by video conference in place of cancelled appointment due to heighten concern over coronavirus infection.\par Verbal consent was given on *** 10/19/2021 *** by the patient, who understand that tele-visits will be charged to insurance and may involve co-pay for patient\par Physician location home\par Patient location home\par Patient on call: Dr. Tineo ,patient\par \par MELISSA BRITO is seen for follow up. she tolerated well EMU admission and 5 days course of solumedrol.\par her CSF returned positive for JT antibody.\par \par she will resume her IVIG and Rituxan\par \par \par \par \par Patient is a 25 year old female past medical history significant for AIE presenting for further evaluation and management of AIE. Patient reports she began suffering from symptoms at age 18 years. symptoms initially presented as insomnia. Patient began to experience seizure events.  Patient began to demonstrate poor functioning in school, reporting poor concentration and poor working memory skills Patient reports having to partially cover large blocks of text in order to be able to comprehend the information contained therein. Patient was diagnosed with Hashimoto's encephalopathy. TPO antibodies were seen to be elevated. CSF analysis basic studies were normal. CSF AIE panel demonstrated rising TPO, minor elevation in JT no other abnormalities.  PET scan done in June 2020, demonstrated cerebellar hypometabolism.  Patient was treated with steroids, IVIG. and rituximab. symptoms began to improve. Steroids were oral daily, IVIG was monthly, and Rituximab dosed intermittently. Patient reports completing college. From [id number] patient was treated with oral prednisone and intermittent dosing on rituximab based on CD 20 counts. Patient was latter followed by another doctor, who favored a diagnosis of functional neurological disorder. Patient was treated with low dose of methotrexate, but tolerated treatment poorly. \par \par Patient is currently on prednisone 24 daily. Patient is dependant on this dose, with symptoms reemerging when any further downtitration. Currently, patient reports sensitivity to light and loud sounds. \par

## 2022-05-02 NOTE — DISCUSSION/SUMMARY
[FreeTextEntry1] : Patient presents for evaluation of AIE. patient. Patient with previous CSF analysis demonstrating elevation in TPO antibody and minor possibly beneath threshold evaluation for JT. Patient's wish is to be titrated off steroids as the negative side effects, are burdensome. Patient \par \par \par plan: IVIG 2 grams /kg/ month divided q 2 weeks for better tolerability\par Rituxan 1000 /Actemra 800 according to the schedule ( monthly actemra and q 6 months Rituxan)\par prednisone 5 mg daily\par \par may need Myfortic ( Cellcept XR) if she does not tolerate tapering off prednisone\par \par she is on Strattera 80\par WILL ADD fOCALIN 5 MG IN AMA AND EVENTUALLY 5 MG IN PM FOR Better FOCUSING\par SHE WILL NEED A PORT FOR ACCESS.\par \par

## 2022-06-07 RX ORDER — PREDNISONE 10 MG/1
10 TABLET ORAL
Qty: 30 | Refills: 0 | Status: ACTIVE | COMMUNITY
Start: 2022-06-07 | End: 1900-01-01

## 2022-06-07 RX ORDER — PREDNISONE 20 MG/1
20 TABLET ORAL
Qty: 30 | Refills: 0 | Status: ACTIVE | COMMUNITY
Start: 2022-06-07 | End: 1900-01-01

## 2022-06-13 ENCOUNTER — TRANSCRIPTION ENCOUNTER (OUTPATIENT)
Age: 27
End: 2022-06-13

## 2022-06-14 ENCOUNTER — TRANSCRIPTION ENCOUNTER (OUTPATIENT)
Age: 27
End: 2022-06-14

## 2022-07-11 RX ORDER — LIDOCAINE AND PRILOCAINE 25; 25 MG/G; MG/G
2.5-2.5 CREAM TOPICAL
Qty: 1 | Refills: 0 | Status: ACTIVE | COMMUNITY
Start: 2022-07-11 | End: 1900-01-01

## 2022-07-27 ENCOUNTER — TRANSCRIPTION ENCOUNTER (OUTPATIENT)
Age: 27
End: 2022-07-27

## 2022-07-27 RX ORDER — PREDNISONE 10 MG/1
10 TABLET ORAL
Qty: 30 | Refills: 11 | Status: ACTIVE | COMMUNITY
Start: 2022-02-23 | End: 1900-01-01

## 2022-07-27 RX ORDER — PREDNISONE 20 MG/1
20 TABLET ORAL
Qty: 30 | Refills: 11 | Status: ACTIVE | COMMUNITY
Start: 2021-11-01 | End: 1900-01-01

## 2022-09-09 ENCOUNTER — APPOINTMENT (OUTPATIENT)
Dept: NEUROLOGY | Facility: CLINIC | Age: 27
End: 2022-09-09

## 2022-09-09 DIAGNOSIS — R76.0 STIFF-MAN SYNDROME: ICD-10-CM

## 2022-09-09 DIAGNOSIS — G25.82 STIFF-MAN SYNDROME: ICD-10-CM

## 2022-09-09 PROCEDURE — 99214 OFFICE O/P EST MOD 30 MIN: CPT | Mod: 95

## 2022-09-11 PROBLEM — G25.82 STIFF PERSON SYNDROME WITH POSITIVE GLUTAMIC ACID DECARBOXYLASE (GAD) ANTIBODY: Status: ACTIVE | Noted: 2021-10-03

## 2022-09-11 NOTE — HISTORY OF PRESENT ILLNESS
[FreeTextEntry1] : *** 05/02/2022 ***\par \par Appointment was conducted by video conference in place of cancelled appointment due to heighten concern over coronavirus infection.\par Verbal consent was given on *** 05/02/2022 *** by the patient, who understand that tele-visits will be charged to insurance and may involve co-pay for patient\par Physician location home\par Patient location home\par Patient on call: Dr. Tineo , patient\par \par \par MELISSA BRITO is seen for follow up. she continue to have difficulties sleeping, focusing, remembering thinks.\par she received Rituxan x2 in December 2021, Actemra 800 x1, and IVIG q 2 weeks.\par \par \par *** 02/08/2022 ***\par \par Appointment was conducted by video conference in place of cancelled appointment due to heighten concern over coronavirus infection.\par Verbal consent was given on *** 02/08/2022 ***  by the patient, who understand that tele-visits will be charged to insurance and may involve co-pay for patient\par Physician location home\par Patient location home\par Patient on call: Dr. Tineo , patient\par \par \par MELISSA BRITO is seen for follow up. stable, tolerating well steroid taper\par \par \par \par *** 12/08/2021 ***\par \par MELISSA BRTIO is seen for follow up. mild but steady improvement \par Appointment was conducted by video conference in place of cancelled appointment due to heighten concern over coronavirus infection.\par Verbal consent was given on *** 12/08/2021 ***  by the patient, who understand that tele-visits will be charged to insurance and may involve co-pay for patient\par Physician location home\par Patient location home\par Patient on call: Dr. Tineo ,patient\par  \par \par MELISSA BRITO is seen for follow up. she has mild improvement but not even close to baseline. still on steroids ( 20). received actemra, awaiting IVIG and rituxan\par \par *** 11/12/2021 ***\par \par MELISSA BRITO is seen for follow up. mild but steady improvement \par Appointment was conducted by video conference in place of cancelled appointment due to heighten concern over coronavirus infection.\par Verbal consent was given on *** 11/12/2021 ***  by the patient, who understand that tele-visits will be charged to insurance and may involve co-pay for patient\par Physician location home\par Patient location home\par Patient on call: Dr. Tineo , patient\par  \par \par \par *** 10/19/2021 ***\par \par Appointment was conducted by video conference in place of cancelled appointment due to heighten concern over coronavirus infection.\par Verbal consent was given on *** 10/19/2021 *** by the patient, who understand that tele-visits will be charged to insurance and may involve co-pay for patient\par Physician location home\par Patient location home\par Patient on call: Dr. Tineo ,patient\par \par MELISSA BRITO is seen for follow up. she tolerated well EMU admission and 5 days course of solumedrol.\par her CSF returned positive for JT antibody.\par \par she will resume her IVIG and Rituxan\par \par \par \par \par Patient is a 25 year old female past medical history significant for AIE presenting for further evaluation and management of AIE. Patient reports she began suffering from symptoms at age 18 years. symptoms initially presented as insomnia. Patient began to experience seizure events.  Patient began to demonstrate poor functioning in school, reporting poor concentration and poor working memory skills Patient reports having to partially cover large blocks of text in order to be able to comprehend the information contained therein. Patient was diagnosed with Hashimoto's encephalopathy. TPO antibodies were seen to be elevated. CSF analysis basic studies were normal. CSF AIE panel demonstrated rising TPO, minor elevation in JT no other abnormalities.  PET scan done in June 2020, demonstrated cerebellar hypometabolism.  Patient was treated with steroids, IVIG. and rituximab. symptoms began to improve. Steroids were oral daily, IVIG was monthly, and Rituximab dosed intermittently. Patient reports completing college. From [id number] patient was treated with oral prednisone and intermittent dosing on rituximab based on CD 20 counts. Patient was latter followed by another doctor, who favored a diagnosis of functional neurological disorder. Patient was treated with low dose of methotrexate, but tolerated treatment poorly. \par \par Patient is currently on prednisone 24 daily. Patient is dependant on this dose, with symptoms reemerging when any further downtitration. Currently, patient reports sensitivity to light and loud sounds. \par

## 2022-09-23 ENCOUNTER — TRANSCRIPTION ENCOUNTER (OUTPATIENT)
Age: 27
End: 2022-09-23

## 2022-09-29 ENCOUNTER — TRANSCRIPTION ENCOUNTER (OUTPATIENT)
Age: 27
End: 2022-09-29

## 2022-10-13 RX ORDER — RITUXIMAB 10 MG/ML
500 INJECTION, SOLUTION INTRAVENOUS
Qty: 4 | Refills: 3 | Status: ACTIVE | OUTPATIENT
Start: 2021-10-25

## 2022-11-16 RX ORDER — IMMUNE GLOBULIN INFUSION (HUMAN) 100 MG/ML
30 INJECTION, SOLUTION INTRAVENOUS; SUBCUTANEOUS
Qty: 6 | Refills: 11 | Status: ACTIVE | OUTPATIENT
Start: 2021-11-16

## 2022-11-22 RX ORDER — TRAZODONE HYDROCHLORIDE 100 MG/1
100 TABLET ORAL
Qty: 30 | Refills: 5 | Status: ACTIVE | COMMUNITY
Start: 2022-02-08 | End: 1900-01-01

## 2022-12-09 ENCOUNTER — APPOINTMENT (OUTPATIENT)
Dept: NEUROLOGY | Facility: CLINIC | Age: 27
End: 2022-12-09

## 2022-12-09 DIAGNOSIS — F90.2 ATTENTION-DEFICIT HYPERACTIVITY DISORDER, COMBINED TYPE: ICD-10-CM

## 2022-12-09 DIAGNOSIS — G93.49 AUTOIMMUNE THYROIDITIS: ICD-10-CM

## 2022-12-09 DIAGNOSIS — G04.81 OTHER ENCEPHALITIS AND ENCEPHALOMYELITIS: ICD-10-CM

## 2022-12-09 DIAGNOSIS — E06.3 AUTOIMMUNE THYROIDITIS: ICD-10-CM

## 2022-12-09 PROCEDURE — 99214 OFFICE O/P EST MOD 30 MIN: CPT | Mod: 95

## 2022-12-11 PROBLEM — F90.2 ADHD (ATTENTION DEFICIT HYPERACTIVITY DISORDER), COMBINED TYPE: Status: ACTIVE | Noted: 2022-05-02

## 2022-12-11 PROBLEM — G04.81 AUTOIMMUNE ENCEPHALITIS: Status: ACTIVE | Noted: 2021-10-03

## 2022-12-11 PROBLEM — E06.3 HASHIMOTO ENCEPHALOPATHY: Status: ACTIVE | Noted: 2021-10-03

## 2022-12-11 NOTE — HISTORY OF PRESENT ILLNESS
[FreeTextEntry1] : *** 05/02/2022 ***\par \par Appointment was conducted by video conference in place of cancelled appointment due to heighten concern over coronavirus infection.\par Verbal consent was given on *** 05/02/2022 *** by the patient, who understand that tele-visits will be charged to insurance and may involve co-pay for patient\par Physician location home\par Patient location home\par Patient on call: Dr. Tineo , patient\par \par \par MELISSA BRITO is seen for follow up. she continue to have difficulties sleeping, focusing, remembering thinks.\par she received Rituxan x2 in December 2021, Actemra 800 x1, and IVIG q 2 weeks.\par \par \par *** 02/08/2022 ***\par \par Appointment was conducted by video conference in place of cancelled appointment due to heighten concern over coronavirus infection.\par Verbal consent was given on *** 02/08/2022 ***  by the patient, who understand that tele-visits will be charged to insurance and may involve co-pay for patient\par Physician location home\par Patient location home\par Patient on call: Dr. Tineo , patient\par \par \par MELISSA BRITO is seen for follow up. stable, tolerating well steroid taper\par \par \par \par *** 12/08/2021 ***\par \par MELISSA BRITO is seen for follow up. mild but steady improvement \par Appointment was conducted by video conference in place of cancelled appointment due to heighten concern over coronavirus infection.\par Verbal consent was given on *** 12/08/2021 ***  by the patient, who understand that tele-visits will be charged to insurance and may involve co-pay for patient\par Physician location home\par Patient location home\par Patient on call: Dr. Tineo ,patient\par  \par \par MELISSA BRITO is seen for follow up. she has mild improvement but not even close to baseline. still on steroids ( 20). received actemra, awaiting IVIG and rituxan\par \par *** 11/12/2021 ***\par \par MELISSA BRITO is seen for follow up. mild but steady improvement \par Appointment was conducted by video conference in place of cancelled appointment due to heighten concern over coronavirus infection.\par Verbal consent was given on *** 11/12/2021 ***  by the patient, who understand that tele-visits will be charged to insurance and may involve co-pay for patient\par Physician location home\par Patient location home\par Patient on call: Dr. Tineo , patient\par  \par \par \par *** 10/19/2021 ***\par \par Appointment was conducted by video conference in place of cancelled appointment due to heighten concern over coronavirus infection.\par Verbal consent was given on *** 10/19/2021 *** by the patient, who understand that tele-visits will be charged to insurance and may involve co-pay for patient\par Physician location home\par Patient location home\par Patient on call: Dr. Tineo ,patient\par \par MELISSA BRITO is seen for follow up. she tolerated well EMU admission and 5 days course of solumedrol.\par her CSF returned positive for JT antibody.\par \par she will resume her IVIG and Rituxan\par \par \par \par \par Patient is a 25 year old female past medical history significant for AIE presenting for further evaluation and management of AIE. Patient reports she began suffering from symptoms at age 18 years. symptoms initially presented as insomnia. Patient began to experience seizure events.  Patient began to demonstrate poor functioning in school, reporting poor concentration and poor working memory skills Patient reports having to partially cover large blocks of text in order to be able to comprehend the information contained therein. Patient was diagnosed with Hashimoto's encephalopathy. TPO antibodies were seen to be elevated. CSF analysis basic studies were normal. CSF AIE panel demonstrated rising TPO, minor elevation in JT no other abnormalities.  PET scan done in June 2020, demonstrated cerebellar hypometabolism.  Patient was treated with steroids, IVIG. and rituximab. symptoms began to improve. Steroids were oral daily, IVIG was monthly, and Rituximab dosed intermittently. Patient reports completing college. From [id number] patient was treated with oral prednisone and intermittent dosing on rituximab based on CD 20 counts. Patient was latter followed by another doctor, who favored a diagnosis of functional neurological disorder. Patient was treated with low dose of methotrexate, but tolerated treatment poorly. \par \par Patient is currently on prednisone 24 daily. Patient is dependant on this dose, with symptoms reemerging when any further downtitration. Currently, patient reports sensitivity to light and loud sounds. \par

## 2022-12-22 ENCOUNTER — TRANSCRIPTION ENCOUNTER (OUTPATIENT)
Age: 27
End: 2022-12-22

## 2023-01-09 ENCOUNTER — TRANSCRIPTION ENCOUNTER (OUTPATIENT)
Age: 28
End: 2023-01-09

## 2023-01-11 RX ORDER — TOCILIZUMAB 20 MG/ML
400 INJECTION, SOLUTION, CONCENTRATE INTRAVENOUS
Qty: 40 | Refills: 11 | Status: ACTIVE | OUTPATIENT
Start: 2021-10-25

## 2023-01-11 RX ORDER — LISDEXAMFETAMINE DIMESYLATE 10 MG/1
10 CAPSULE ORAL DAILY
Qty: 30 | Refills: 0 | Status: ACTIVE | COMMUNITY
Start: 2022-12-11 | End: 1900-01-01

## 2023-01-17 ENCOUNTER — TRANSCRIPTION ENCOUNTER (OUTPATIENT)
Age: 28
End: 2023-01-17

## 2023-01-18 ENCOUNTER — TRANSCRIPTION ENCOUNTER (OUTPATIENT)
Age: 28
End: 2023-01-18

## 2023-01-20 ENCOUNTER — TRANSCRIPTION ENCOUNTER (OUTPATIENT)
Age: 28
End: 2023-01-20

## 2023-01-23 ENCOUNTER — TRANSCRIPTION ENCOUNTER (OUTPATIENT)
Age: 28
End: 2023-01-23

## 2023-01-27 ENCOUNTER — APPOINTMENT (OUTPATIENT)
Dept: NEUROLOGY | Facility: CLINIC | Age: 28
End: 2023-01-27

## 2023-01-27 ENCOUNTER — TRANSCRIPTION ENCOUNTER (OUTPATIENT)
Age: 28
End: 2023-01-27

## 2023-01-30 ENCOUNTER — TRANSCRIPTION ENCOUNTER (OUTPATIENT)
Age: 28
End: 2023-01-30

## 2023-03-01 ENCOUNTER — APPOINTMENT (OUTPATIENT)
Dept: NEUROLOGY | Facility: CLINIC | Age: 28
End: 2023-03-01

## 2023-03-30 NOTE — DISCHARGE NOTE PROVIDER - NSDCMRMEDTOKEN_GEN_ALL_CORE_FT
127 alendronate 70 mg oral tablet: 1 tab(s) orally once a week  cannabidiol 100 mg/mL oral liquid: 0.5 milliliter(s) orally 2 times a day  cloNIDine 0.2 mg oral tablet: 1 tab(s) orally once a day (at bedtime)  folic acid 1 mg oral tablet: 1 tab(s) orally once a day except MTX days  gabapentin 600 mg oral tablet: 1 tab(s) orally once a day  IVI gram(s) intravenous every 2 weeks  Melatonin 10 mg oral capsule: 1 cap(s) orally once a day (at bedtime)  methotrexate 15 mg oral tablet: 1 tab(s) orally once a week  Plaquenil: 200 milligram(s) orally 2 times a day  predniSONE: 24 milligram(s) orally once a day  Strattera 60 mg oral capsule: 1 cap(s) orally once a day (in the morning)  Tri-Sprintec oral tablet: 1 tab(s) orally once a day   alendronate 70 mg oral tablet: 1 tab(s) orally once a week  cannabidiol 100 mg/mL oral liquid: 0.5 milliliter(s) orally 2 times a day  cloNIDine 0.2 mg oral tablet: 1 tab(s) orally once a day (at bedtime)  folic acid 1 mg oral tablet: 1 tab(s) orally once a day except MTX days  gabapentin 600 mg oral tablet: 1 tab(s) orally once a day  IVI gram(s) intravenous every 2 weeks  Melatonin 10 mg oral capsule: 1 cap(s) orally once a day (at bedtime)  Strattera 60 mg oral capsule: 1 cap(s) orally once a day (in the morning)  Tri-Sprintec oral tablet: 1 tab(s) orally once a day   alendronate 70 mg oral tablet: 1 tab(s) orally once a week  cannabidiol 100 mg/mL oral liquid: 0.5 milliliter(s) orally 2 times a day  cloNIDine 0.2 mg oral tablet: 1 tab(s) orally once a day (at bedtime)  folic acid 1 mg oral tablet: 1 tab(s) orally once a day except MTX days  gabapentin 600 mg oral tablet: 1 tab(s) orally once a day  Melatonin 10 mg oral capsule: 1 cap(s) orally once a day (at bedtime)  pantoprazole 40 mg oral delayed release tablet: 1 tab(s) orally once a day (before a meal)  penicillin G benzathine 2,400,000 units/4 mL intramuscular suspension: 2.4 million unit(s) intramuscularly once a week on 10/11/21 and 10/18/21  predniSONE 10 mg oral tablet: 6 tab(s) orally once a day for 5 days, 4 tabs orally once a day for 7 days, 2 tabs orally once a day for 7 days, 1 tab orally once a day for 7 days then stop  Strattera 60 mg oral capsule: 1 cap(s) orally once a day (in the morning)  Tri-Sprintec oral tablet: 1 tab(s) orally once a day   alendronate 70 mg oral tablet: 1 tab(s) orally once a week  cannabidiol 100 mg/mL oral liquid: 0.5 milliliter(s) orally 2 times a day  cloNIDine 0.2 mg oral tablet: 1 tab(s) orally once a day (at bedtime)  folic acid 1 mg oral tablet: 1 tab(s) orally once a day  gabapentin 600 mg oral tablet: 1 tab(s) orally once a day  Melatonin 10 mg oral capsule: 1 cap(s) orally once a day (at bedtime)  pantoprazole 40 mg oral delayed release tablet: 1 tab(s) orally once a day (before a meal)  penicillin G benzathine 2,400,000 units/4 mL intramuscular suspension: 2.4 million unit(s) intramuscularly once a week on 10/11/21 and 10/18/21  predniSONE 10 mg oral tablet: 6 tab(s) orally once a day for 5 days, 4 tabs orally once a day for 7 days, 2 tabs orally once a day for 7 days, 1 tab orally once a day for 7 days then stop  Strattera 60 mg oral capsule: 1 cap(s) orally once a day (in the morning)  Tri-Sprintec oral tablet: 1 tab(s) orally once a day

## 2023-06-16 ENCOUNTER — TRANSCRIPTION ENCOUNTER (OUTPATIENT)
Age: 28
End: 2023-06-16

## 2023-07-31 ENCOUNTER — TRANSCRIPTION ENCOUNTER (OUTPATIENT)
Age: 28
End: 2023-07-31

## 2023-08-02 ENCOUNTER — TRANSCRIPTION ENCOUNTER (OUTPATIENT)
Age: 28
End: 2023-08-02

## 2023-09-06 ENCOUNTER — TRANSCRIPTION ENCOUNTER (OUTPATIENT)
Age: 28
End: 2023-09-06

## 2023-09-08 ENCOUNTER — TRANSCRIPTION ENCOUNTER (OUTPATIENT)
Age: 28
End: 2023-09-08

## 2024-09-24 ENCOUNTER — APPOINTMENT (OUTPATIENT)
Dept: NEUROLOGY | Facility: CLINIC | Age: 29
End: 2024-09-24

## 2024-09-24 PROCEDURE — 99214 OFFICE O/P EST MOD 30 MIN: CPT | Mod: 95

## 2024-09-25 NOTE — PROGRESS NOTE ADULT - ASSESSMENT
25y R-handed F with h/o hashimoto's thyroiditis, autoimmune encephalitis and recently diagnosed SLE. On exam, she has no neurologic deficit.     IMPRESSION: Previously diagnosed autoimmune encephalitis of undetermined specific serology, perhaps secondary to Hashimoto vs. JT.  DDx perhaps including limbic encephalitis as well.     Plan  [] Admit to EMU for further workup of specific AIE  [] LP with repeat AIE panel including markers for synaptic dysfunction  [] 24 hr vEEG to evaluate for slowing  [] 5 days of 1000mg methylprednisolone.    [] Send TPO thyroglobulin.    [] D/C methotrexate and PO prednisone 24 mg QD.    [] Continue sleep meds   [] No anti-epileptics for now as PT   [] Plan to repeat MRI brain w/wo w epilepsy protocol and at some point PET scan  [] PT may need Actemra   [] Last IVIG 09/22.   [] Monitor BP and HR, PT apparently runs baseline tachycardic.      **INCOMPLETE** 25y R-handed F with h/o hashimoto's thyroiditis, autoimmune encephalitis and recently diagnosed SLE. On exam, she has no neurologic deficit.     IMPRESSION: Previously diagnosed autoimmune encephalitis of undetermined specific serology, perhaps secondary to Hashimoto vs. JT.  DDx perhaps including limbic encephalitis as well.     Plan  [] s/p LP today  with repeat AIE panel including markers for synaptic dysfunction - YKL 40, SNAP 25, Neuro Granin  [] 24 hr vEEG to evaluate for slowing  [] 5 days of 1000mg methylprednisolone.    [] TPO thyroglobulin.    [] D/C methotrexate and Plaquenil and PO prednisone 24 mg QD.    [] Continue sleep meds   [] No anti-epileptics for now as PT   [] Plan to repeat MRI brain w/wo w epilepsy protocol and at some point PET scan  [] PT will need Actemra after course of steroids         Case discussed with neurology attending Dr. Tineo none

## 2025-03-17 ENCOUNTER — TELEPHONE (OUTPATIENT)
Dept: NEUROLOGY | Facility: CLINIC | Age: 30
End: 2025-03-17
Payer: COMMERCIAL

## 2025-03-17 NOTE — TELEPHONE ENCOUNTER
I called Makayla today  to collect some of her records prior to her initial consult for AE. I recognized her voice and asked her if she was at the [employer]. It's her. She & her mom presented at the [employer] event. Super nice family. I'm glad to see they are coming to [place] to see you.

## 2025-06-20 ENCOUNTER — TRANSCRIPTION ENCOUNTER (OUTPATIENT)
Age: 30
End: 2025-06-20